# Patient Record
Sex: MALE | Race: WHITE
[De-identification: names, ages, dates, MRNs, and addresses within clinical notes are randomized per-mention and may not be internally consistent; named-entity substitution may affect disease eponyms.]

---

## 2017-10-26 ENCOUNTER — HOSPITAL ENCOUNTER (OUTPATIENT)
Dept: HOSPITAL 75 - RAD | Age: 77
End: 2017-10-26
Attending: INTERNAL MEDICINE
Payer: MEDICARE

## 2017-10-26 DIAGNOSIS — R05: ICD-10-CM

## 2017-10-26 DIAGNOSIS — J45.909: ICD-10-CM

## 2017-10-26 DIAGNOSIS — R06.00: Primary | ICD-10-CM

## 2017-10-26 LAB
ALBUMIN SERPL-MCNC: 4.2 GM/DL (ref 3.2–4.5)
ALT SERPL-CCNC: 29 U/L (ref 0–55)
ANION GAP SERPL CALC-SCNC: 8 MMOL/L (ref 5–14)
AST SERPL-CCNC: 24 U/L (ref 5–34)
BASOPHILS # BLD AUTO: 0 10^3/UL (ref 0–0.1)
BASOPHILS NFR BLD AUTO: 1 % (ref 0–10)
BILIRUB SERPL-MCNC: 0.6 MG/DL (ref 0.1–1)
BUN SERPL-MCNC: 11 MG/DL (ref 7–18)
BUN/CREAT SERPL: 11
CALCIUM SERPL-MCNC: 9.8 MG/DL (ref 8.5–10.1)
CHLORIDE SERPL-SCNC: 106 MMOL/L (ref 98–107)
CO2 SERPL-SCNC: 24 MMOL/L (ref 21–32)
CREAT SERPL-MCNC: 0.96 MG/DL (ref 0.6–1.3)
EOSINOPHIL # BLD AUTO: 0.1 10^3/UL (ref 0–0.3)
EOSINOPHIL NFR BLD AUTO: 1 % (ref 0–10)
ERYTHROCYTE [DISTWIDTH] IN BLOOD BY AUTOMATED COUNT: 13.7 % (ref 10–14.5)
GFR SERPLBLD BASED ON 1.73 SQ M-ARVRAT: > 60 ML/MIN
GLUCOSE SERPL-MCNC: 123 MG/DL (ref 70–105)
LYMPHOCYTES # BLD AUTO: 1.3 X 10^3 (ref 1–4)
LYMPHOCYTES NFR BLD AUTO: 25 % (ref 12–44)
MCH RBC QN AUTO: 34 PG (ref 25–34)
MCHC RBC AUTO-ENTMCNC: 34 G/DL (ref 32–36)
MCV RBC AUTO: 100 FL (ref 80–99)
MONOCYTES # BLD AUTO: 0.4 X 10^3 (ref 0–1)
MONOCYTES NFR BLD AUTO: 8 % (ref 0–12)
NEUTROPHILS # BLD AUTO: 3.6 X 10^3 (ref 1.8–7.8)
NEUTROPHILS NFR BLD AUTO: 66 % (ref 42–75)
PLATELET # BLD: 255 10^3/UL (ref 130–400)
PMV BLD AUTO: 8.8 FL (ref 7.4–10.4)
POTASSIUM SERPL-SCNC: 4.4 MMOL/L (ref 3.6–5)
PROT SERPL-MCNC: 7.6 GM/DL (ref 6.4–8.2)
RBC # BLD AUTO: 4.06 10^6/UL (ref 4.35–5.85)
SODIUM SERPL-SCNC: 138 MMOL/L (ref 135–145)
WBC # BLD AUTO: 5.5 10^3/UL (ref 4.3–11)

## 2017-10-26 PROCEDURE — 80053 COMPREHEN METABOLIC PANEL: CPT

## 2017-10-26 PROCEDURE — 85025 COMPLETE CBC W/AUTO DIFF WBC: CPT

## 2017-10-26 PROCEDURE — 83880 ASSAY OF NATRIURETIC PEPTIDE: CPT

## 2017-10-26 PROCEDURE — 36415 COLL VENOUS BLD VENIPUNCTURE: CPT

## 2017-10-26 PROCEDURE — 71020: CPT

## 2017-10-26 NOTE — DIAGNOSTIC IMAGING REPORT
PA and lateral views of the chest



Indication:  Shortness of breath



Findings: The lungs are clear. The heart size is normal. There is

no effusion or pneumothorax



The mediastinum and minh appear unremarkable.



Impression: Unremarkable study.



Dictated by: 



  Dictated on workstation # [id number]

## 2018-08-07 ENCOUNTER — HOSPITAL ENCOUNTER (OUTPATIENT)
Dept: HOSPITAL 75 - LAB | Age: 78
LOS: 24 days | Discharge: HOME | End: 2018-08-31
Attending: PHYSICIAN ASSISTANT
Payer: MEDICARE

## 2018-08-07 DIAGNOSIS — R19.7: Primary | ICD-10-CM

## 2018-08-07 PROCEDURE — 87329 GIARDIA AG IA: CPT

## 2018-08-07 PROCEDURE — 87449 NOS EACH ORGANISM AG IA: CPT

## 2018-08-07 PROCEDURE — 87046 STOOL CULTR AEROBIC BACT EA: CPT

## 2018-08-07 PROCEDURE — 87324 CLOSTRIDIUM AG IA: CPT

## 2018-08-07 PROCEDURE — 87328 CRYPTOSPORIDIUM AG IA: CPT

## 2018-08-07 PROCEDURE — 87045 FECES CULTURE AEROBIC BACT: CPT

## 2018-08-29 ENCOUNTER — APPOINTMENT (RX ONLY)
Dept: URBAN - METROPOLITAN AREA CLINIC 51 | Facility: CLINIC | Age: 78
Setting detail: DERMATOLOGY
End: 2018-08-29

## 2018-08-29 DIAGNOSIS — L57.0 ACTINIC KERATOSIS: ICD-10-CM

## 2018-08-29 PROBLEM — J45.909 UNSPECIFIED ASTHMA, UNCOMPLICATED: Status: ACTIVE | Noted: 2018-08-29

## 2018-08-29 PROBLEM — Z85.828 PERSONAL HISTORY OF OTHER MALIGNANT NEOPLASM OF SKIN: Status: ACTIVE | Noted: 2018-08-29

## 2018-08-29 PROBLEM — D48.5 NEOPLASM OF UNCERTAIN BEHAVIOR OF SKIN: Status: ACTIVE | Noted: 2018-08-29

## 2018-08-29 PROBLEM — M12.9 ARTHROPATHY, UNSPECIFIED: Status: ACTIVE | Noted: 2018-08-29

## 2018-08-29 PROBLEM — F41.9 ANXIETY DISORDER, UNSPECIFIED: Status: ACTIVE | Noted: 2018-08-29

## 2018-08-29 PROBLEM — J44.9 CHRONIC OBSTRUCTIVE PULMONARY DISEASE, UNSPECIFIED: Status: ACTIVE | Noted: 2018-08-29

## 2018-08-29 PROCEDURE — ? DEFER

## 2018-08-29 PROCEDURE — ? COUNSELING

## 2018-08-29 PROCEDURE — ? BIOPSY BY SHAVE METHOD

## 2018-08-29 PROCEDURE — 99202 OFFICE O/P NEW SF 15 MIN: CPT | Mod: 25

## 2018-08-29 PROCEDURE — 11100: CPT

## 2018-08-29 ASSESSMENT — LOCATION ZONE DERM
LOCATION ZONE: EAR
LOCATION ZONE: NECK

## 2018-08-29 ASSESSMENT — PAIN INTENSITY VAS: HOW INTENSE IS YOUR PAIN 0 BEING NO PAIN, 10 BEING THE MOST SEVERE PAIN POSSIBLE?: NO PAIN

## 2018-08-29 ASSESSMENT — LOCATION DETAILED DESCRIPTION DERM
LOCATION DETAILED: LEFT SUPERIOR LATERAL NECK
LOCATION DETAILED: LEFT SCAPHA

## 2018-08-29 ASSESSMENT — LOCATION SIMPLE DESCRIPTION DERM
LOCATION SIMPLE: LEFT EAR
LOCATION SIMPLE: NECK

## 2018-08-29 NOTE — HPI: SKIN LESION
Is This A New Presentation, Or A Follow-Up?: Skin Lesion
How Severe Is Your Skin Lesion?: moderate
Has Your Skin Lesion Been Treated?: not been treated
Additional History: Pt states that the larger lesion was drained and instructed that it was a cyst and let heal.\\nSmaller lesion was biopsied and was instructed to RTC to have lesion treated - biopsy proven to be BCC. Pt here today to have re-evaluated.

## 2018-08-29 NOTE — PROCEDURE: BIOPSY BY SHAVE METHOD
Hemostasis: Electrocautery
Was A Bandage Applied: Yes
Lab Facility: [id number]
Lab: [id number]
Bill [id number] For Specimen Handling/Conveyance To Laboratory?: no
Billing Type: Third-Party Bill
Notification Instructions: Patient will be notified of biopsy results. However, patient instructed to call the office if not contacted within 2 weeks.
Detail Level: Detailed
Wound Care: Aquaphor
Path Notes (To The Dermatopathologist): 2.0 cm
Depth Of Biopsy: dermis
Consent: Verbal consent was obtained and risks were reviewed including but not limited to scarring, infection, bleeding, scabbing, incomplete removal, nerve damage and allergy to anesthesia.
Anesthesia Volume In Cc (Will Not Render If 0): 1
Size Of Lesion In Cm: 2
Anesthesia Type: 1% lidocaine with epinephrine
Biopsy Method: Terrence lewis
Post-Care Instructions: I reviewed with the patient in detail post-care instructions. Patient is to keep the biopsy site dry overnight, and then apply vaseline twice daily until healed. Patient may apply hydrogen peroxide soaks to remove any crusting.
X Size Of Lesion In Cm: 0
Dressing: Band-Aid
Anticipated Plan (Based On Presumed Biopsy Results): If proven to be SCC, will consider MOHS or ENT in [facility] (Dr. Kapoor)
Type Of Destruction Used: Electrodesiccation
Biopsy Type: H and E

## 2018-10-31 ENCOUNTER — HOSPITAL ENCOUNTER (OUTPATIENT)
Dept: HOSPITAL 75 - PREOP | Age: 78
Discharge: HOME | End: 2018-10-31
Attending: OTOLARYNGOLOGY
Payer: MEDICARE

## 2018-10-31 VITALS — SYSTOLIC BLOOD PRESSURE: 153 MMHG | DIASTOLIC BLOOD PRESSURE: 84 MMHG

## 2018-10-31 VITALS — HEIGHT: 78 IN | BODY MASS INDEX: 27.77 KG/M2 | WEIGHT: 240 LBS

## 2018-10-31 DIAGNOSIS — Z11.2: ICD-10-CM

## 2018-10-31 DIAGNOSIS — Z01.811: ICD-10-CM

## 2018-10-31 DIAGNOSIS — Z01.812: Primary | ICD-10-CM

## 2018-10-31 DIAGNOSIS — Z01.810: ICD-10-CM

## 2018-10-31 LAB
BASOPHILS # BLD AUTO: 0 10^3/UL (ref 0–0.1)
BASOPHILS NFR BLD AUTO: 0 % (ref 0–10)
BUN/CREAT SERPL: 13
CALCIUM SERPL-MCNC: 9.9 MG/DL (ref 8.5–10.1)
CHLORIDE SERPL-SCNC: 106 MMOL/L (ref 98–107)
CO2 SERPL-SCNC: 24 MMOL/L (ref 21–32)
CREAT SERPL-MCNC: 0.99 MG/DL (ref 0.6–1.3)
EOSINOPHIL # BLD AUTO: 0.1 10^3/UL (ref 0–0.3)
EOSINOPHIL NFR BLD AUTO: 1 % (ref 0–10)
ERYTHROCYTE [DISTWIDTH] IN BLOOD BY AUTOMATED COUNT: 13.8 % (ref 10–14.5)
GFR SERPLBLD BASED ON 1.73 SQ M-ARVRAT: > 60 ML/MIN
GLUCOSE SERPL-MCNC: 136 MG/DL (ref 70–105)
HCT VFR BLD CALC: 42 % (ref 40–54)
HGB BLD-MCNC: 13.8 G/DL (ref 13.3–17.7)
LYMPHOCYTES # BLD AUTO: 1.4 X 10^3 (ref 1–4)
LYMPHOCYTES NFR BLD AUTO: 22 % (ref 12–44)
MANUAL DIFFERENTIAL PERFORMED BLD QL: NO
MCH RBC QN AUTO: 33 PG (ref 25–34)
MCHC RBC AUTO-ENTMCNC: 33 G/DL (ref 32–36)
MCV RBC AUTO: 101 FL (ref 80–99)
MONOCYTES # BLD AUTO: 0.6 X 10^3 (ref 0–1)
MONOCYTES NFR BLD AUTO: 9 % (ref 0–12)
NEUTROPHILS # BLD AUTO: 4.3 X 10^3 (ref 1.8–7.8)
NEUTROPHILS NFR BLD AUTO: 68 % (ref 42–75)
PLATELET # BLD: 227 10^3/UL (ref 130–400)
PMV BLD AUTO: 9.5 FL (ref 7.4–10.4)
POTASSIUM SERPL-SCNC: 4.3 MMOL/L (ref 3.6–5)
RBC # BLD AUTO: 4.13 10^6/UL (ref 4.35–5.85)
SODIUM SERPL-SCNC: 140 MMOL/L (ref 135–145)
WBC # BLD AUTO: 6.3 10^3/UL (ref 4.3–11)

## 2018-10-31 PROCEDURE — 71046 X-RAY EXAM CHEST 2 VIEWS: CPT

## 2018-10-31 PROCEDURE — 80048 BASIC METABOLIC PNL TOTAL CA: CPT

## 2018-10-31 PROCEDURE — 85025 COMPLETE CBC W/AUTO DIFF WBC: CPT

## 2018-10-31 PROCEDURE — 87081 CULTURE SCREEN ONLY: CPT

## 2018-10-31 PROCEDURE — 93005 ELECTROCARDIOGRAM TRACING: CPT

## 2018-10-31 PROCEDURE — 36415 COLL VENOUS BLD VENIPUNCTURE: CPT

## 2018-10-31 NOTE — DIAGNOSTIC IMAGING REPORT
INDICATION: Preoperative evaluation prior to left face surgery.



PA and lateral views of the chest are obtained with comparison

made to study of 10/26/2017.



FINDINGS: Heart size and pulmonary vascularity are within normal

limits, and the lungs are clear, bilaterally.  



IMPRESSION: Unremarkable chest. 



Dictated by: 



  Dictated on workstation # [id number]

## 2018-11-08 ENCOUNTER — HOSPITAL ENCOUNTER (OUTPATIENT)
Dept: HOSPITAL 75 - SDC | Age: 78
Discharge: HOME | End: 2018-11-08
Attending: OTOLARYNGOLOGY
Payer: MEDICARE

## 2018-11-08 VITALS — SYSTOLIC BLOOD PRESSURE: 125 MMHG | DIASTOLIC BLOOD PRESSURE: 75 MMHG

## 2018-11-08 VITALS — SYSTOLIC BLOOD PRESSURE: 157 MMHG | DIASTOLIC BLOOD PRESSURE: 91 MMHG

## 2018-11-08 VITALS — WEIGHT: 240 LBS | HEIGHT: 78 IN | BODY MASS INDEX: 27.77 KG/M2

## 2018-11-08 VITALS — DIASTOLIC BLOOD PRESSURE: 91 MMHG | SYSTOLIC BLOOD PRESSURE: 157 MMHG

## 2018-11-08 DIAGNOSIS — Z79.899: ICD-10-CM

## 2018-11-08 DIAGNOSIS — J45.909: ICD-10-CM

## 2018-11-08 DIAGNOSIS — J44.9: ICD-10-CM

## 2018-11-08 DIAGNOSIS — K21.9: ICD-10-CM

## 2018-11-08 DIAGNOSIS — C44.320: Primary | ICD-10-CM

## 2018-11-08 PROCEDURE — 88331 PATH CONSLTJ SURG 1 BLK 1SPC: CPT

## 2018-11-08 PROCEDURE — 87081 CULTURE SCREEN ONLY: CPT

## 2018-11-08 PROCEDURE — 88305 TISSUE EXAM BY PATHOLOGIST: CPT

## 2018-11-08 PROCEDURE — 88332 PATH CONSLTJ SURG EA ADD BLK: CPT

## 2018-11-08 NOTE — PROGRESS NOTE-POST OPERATIVE
Post-Operative Progess Note


Surgeon (s)/Assistant (s)


Surgeon


SHILOH DSOUZA MD


Assistant


n/a





Pre-Operative Diagnosis


Large Left Cheeck Lesions-squaous cell carcinoma





Post-Operative Diagnosis


same





Post-Op Procedure Note


Date of Procedure:  Nov 8, 2018


Name of Procedure Performed:  


Excision of Left Cheek Lesion with Local ADvancement Flap REconstruction


Description & Findings


Description and Findings:





n/a


Anesthesia Type


get


Estimated Blood Loss


minimal


Packing


none.


Specimen(s) collected/removed


left checck lesion for margin control











SHILOH DSOUZA MD Nov 8, 2018 9:36 am

## 2018-11-08 NOTE — ANESTHESIA-GENERAL POST-OP
General


Patient Condition


Mental Status/LOC:  Same as Preop


Cardiovascular:  Satisfactory


Nausea/Vomiting:  Absent


Respiratory:  Satisfactory


Pain:  Controlled


Complications:  Absent





Post Op Complications


Complications


None





Follow Up Care/Instructions


Patient Instructions


None needed.





Anesthesia/Patient Condition


Patient Condition


Patient is doing well, no complaints, stable vital signs, no apparent adverse 

anesthesia problems.   


No complications reported per nursing.











ASHLEY YUN CRNA Nov 8, 2018 11:57

## 2018-11-08 NOTE — PROGRESS NOTE-PRE OPERATIVE
Pre-Operative Progress Note


H&P Reviewed


The H&P was reviewed, patient examined and no changes noted.


Date Seen by Provider:  Nov 8, 2018


Time Seen by Provider:  08:40


Date H&P Reviewed:  Nov 8, 2018


Time H&P Reviewed:  08:20


Pre-Operative Diagnosis:  Large Left Cheeck Lesions-squaous cell carcinoma











SHILOH DSOUZA MD Nov 8, 2018 8:22 am

## 2018-11-29 ENCOUNTER — HOSPITAL ENCOUNTER (OUTPATIENT)
Dept: HOSPITAL 75 - RAD | Age: 78
End: 2018-11-29
Attending: NURSE PRACTITIONER
Payer: MEDICARE

## 2018-11-29 DIAGNOSIS — S76.111A: Primary | ICD-10-CM

## 2018-11-29 DIAGNOSIS — S83.241A: ICD-10-CM

## 2018-11-29 DIAGNOSIS — S83.271A: ICD-10-CM
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
## 2018-11-29 DIAGNOSIS — M94.8X8: ICD-10-CM

## 2018-11-29 DIAGNOSIS — M17.11: ICD-10-CM

## 2018-11-29 PROCEDURE — 73721 MRI JNT OF LWR EXTRE W/O DYE: CPT

## 2018-11-29 NOTE — DIAGNOSTIC IMAGING REPORT
PROCEDURE: MRI right joint lower extremity without contrast.



TECHNIQUE: Multiplanar, multisequence non contrast-enhanced MRI

of the right lower extremity was accomplished.



INDICATION: Multiple falls recently with right knee pain. Injury

of right quadriceps muscle and tendon.



COMPARISON: None



FINDINGS:



No acute fractures are seen in the right knee. Alignment appears

normal. There is a large right knee joint effusion.



There is thinning, surface irregularity and heterogeneity of the

articular cartilage in the patellofemoral compartment with a

small full-thickness defect of the median ridge. The articular

cartilage in the medial compartment demonstrates mild

heterogeneity and surface irregularity. The articular cartilage

in the lateral compartment demonstrates mild thinning and surface

irregularity with a small full-thickness defect at the lateral

tibial plateau.



There is complex, predominantly horizontal tearing of the

posterior horn of the medial meniscus. There is also complex

tearing involving the entirety of the lateral meniscus, with

maceration and extrusion. A meniscal fragment is flipped

inferiorly into the meniscotibial recess.



The anterior and posterior cruciate ligaments are intact. The

medial collateral ligament appears intact. The lateral collateral

ligamentous complex is intact.



There is a complete tear through the distal quadriceps tendon.

There is at least 2.5 cm of retraction, although the distal stump

is incompletely included. There is joint fluid extending into the

prepatellar bursa. There is mild soft tissue edema anteriorly.



IMPRESSION:



1. Complete tear through the distal right quadriceps tendon, with

2.5 cm of retraction. The tendon from the vastus medialis appears

to extend somewhat further distally, but also appears completely

torn.



2. Large right knee joint effusion, which extends into the

prepatellar bursa.



3. Tearing of the medial and lateral menisci, likely

degenerative.



4. Mild tricompartmental cartilage loss.



Dictated by: 



  Dictated on workstation # [id number]

## 2018-12-10 NOTE — HISTORY AND PHYSICAL
DATE OF SERVICE:  



ADMISSION HISTORY AND PHYSICAL



DATE OF ADMISSION:

12/19/2018 for right knee quadriceps tendon repair as well as right knee

arthroscopy.



HISTORY OF PRESENT ILLNESS:

The patient is a 78-year-old gentleman with complaints of right knee pain,

catching, locking and swelling.  He had several falls in late September or early

October.  He had difficulty with ambulation following a fall at the beginning of

October.  He reports he has been unable to actively extend the right knee since

that time and has noted a defect.  He also reports some mild anterior knee pain

prior to this.  An MRI was obtained, which revealed medial and lateral meniscal

tears as well as a defect of his distal quadriceps tendon approximately 2.5 cm

in size.  Due to functional impairment and disruption of quadriceps tendon, the

patient has elected to proceed with surgical intervention.  He understands that

he may never regain full function due to the chronic nature of this.



REVIEW OF SYSTEMS:

No chest pain, no shortness of breath.  No dysuria.



PAST MEDICAL HISTORY:

COPD, asthma and dementia.



PAST SURGICAL HISTORY:

Skin cancer excision, cholecystectomy and appendectomy.



FAMILY HISTORY:

Significant for cancer and cardiovascular disease.



PRIMARY CARE PROVIDER:

Dr. Mccray.



MEDICATIONS:

Breo-Ellipta, vilanterol, albuterol and ibuprofen.



ALLERGIES:

PENICILLIN and ___.



SOCIAL HISTORY:

The patient denies alcohol and tobacco use.



PHYSICAL EXAMINATION:

GENERAL:  The patient is a well-developed and well-nourished, in no acute

distress.

HEENT:  Normocephalic and atraumatic.  Pupils are equal, round and reactive to

light.  Oropharynx is clear.

NECK:  Supple and no lymphadenopathy.

LUNGS:  Clear to auscultation bilaterally.

HEART:  Regular rate and rhythm.

ABDOMEN:  Soft, nontender and nondistended.

EXTREMITIES:  The right knee demonstrates a defect just superior to his patella,

which is tender to palpation.  He has weakness with extension and cannot obtain

full extension or maintain full extension.



IMPRESSION:

Right quadriceps tendon disruption with medial and lateral meniscus tears.



PLAN:

Right knee quadriceps tendon repair with possible arthroscopy and partial

meniscectomies.  Risks, benefits, options, ramifications and recovery were

discussed at length with the patient.  He understands and wishes to proceed.





Job ID: [id number]

DocumentID: [id number]

Dictated Date:  12/10/2018 11:08:02

Transcription Date: 12/10/2018 11:46:43

Dictated By: SHILOH LOBO MD

## 2018-12-17 ENCOUNTER — HOSPITAL ENCOUNTER (OUTPATIENT)
Dept: HOSPITAL 75 - PREOP | Age: 78
Discharge: HOME | End: 2018-12-17
Attending: ORTHOPAEDIC SURGERY
Payer: MEDICARE

## 2018-12-17 VITALS — HEIGHT: 78 IN | BODY MASS INDEX: 27.77 KG/M2 | WEIGHT: 240 LBS

## 2018-12-17 DIAGNOSIS — Z01.818: Primary | ICD-10-CM

## 2018-12-19 ENCOUNTER — HOSPITAL ENCOUNTER (OUTPATIENT)
Dept: HOSPITAL 75 - 4TH | Age: 78
LOS: 2 days | Discharge: NURSING, REHAB OR LONG-TERM CARE | End: 2018-12-21
Attending: ORTHOPAEDIC SURGERY
Payer: MEDICARE

## 2018-12-19 VITALS — SYSTOLIC BLOOD PRESSURE: 125 MMHG | DIASTOLIC BLOOD PRESSURE: 72 MMHG

## 2018-12-19 VITALS — SYSTOLIC BLOOD PRESSURE: 132 MMHG | DIASTOLIC BLOOD PRESSURE: 63 MMHG

## 2018-12-19 VITALS — DIASTOLIC BLOOD PRESSURE: 85 MMHG | SYSTOLIC BLOOD PRESSURE: 143 MMHG

## 2018-12-19 VITALS — SYSTOLIC BLOOD PRESSURE: 178 MMHG | DIASTOLIC BLOOD PRESSURE: 87 MMHG

## 2018-12-19 VITALS — DIASTOLIC BLOOD PRESSURE: 68 MMHG | SYSTOLIC BLOOD PRESSURE: 125 MMHG

## 2018-12-19 VITALS — WEIGHT: 232.13 LBS | BODY MASS INDEX: 26.86 KG/M2 | HEIGHT: 78 IN

## 2018-12-19 VITALS — SYSTOLIC BLOOD PRESSURE: 144 MMHG | DIASTOLIC BLOOD PRESSURE: 81 MMHG

## 2018-12-19 VITALS — SYSTOLIC BLOOD PRESSURE: 123 MMHG | DIASTOLIC BLOOD PRESSURE: 70 MMHG

## 2018-12-19 DIAGNOSIS — Z79.899: ICD-10-CM

## 2018-12-19 DIAGNOSIS — F03.90: ICD-10-CM

## 2018-12-19 DIAGNOSIS — R53.81: ICD-10-CM

## 2018-12-19 DIAGNOSIS — J45.909: ICD-10-CM

## 2018-12-19 DIAGNOSIS — J44.9: ICD-10-CM
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
## 2018-12-19 DIAGNOSIS — K21.9: ICD-10-CM

## 2018-12-19 DIAGNOSIS — S76.111A: Primary | ICD-10-CM

## 2018-12-19 PROCEDURE — 87081 CULTURE SCREEN ONLY: CPT

## 2018-12-19 RX ADMIN — OXYCODONE HYDROCHLORIDE AND ACETAMINOPHEN PRN TAB: 5; 325 TABLET ORAL at 13:35

## 2018-12-19 RX ADMIN — SODIUM CHLORIDE, SODIUM LACTATE, POTASSIUM CHLORIDE, AND CALCIUM CHLORIDE PRN MLS/HR: 600; 310; 30; 20 INJECTION, SOLUTION INTRAVENOUS at 08:50

## 2018-12-19 RX ADMIN — SODIUM CHLORIDE, SODIUM LACTATE, POTASSIUM CHLORIDE, AND CALCIUM CHLORIDE PRN MLS/HR: 600; 310; 30; 20 INJECTION, SOLUTION INTRAVENOUS at 10:40

## 2018-12-19 RX ADMIN — OXYCODONE HYDROCHLORIDE AND ACETAMINOPHEN PRN TAB: 5; 325 TABLET ORAL at 15:58

## 2018-12-19 NOTE — PROGRESS NOTE-PRE OPERATIVE
Pre-Operative Progress Note


H&P Reviewed


The H&P was reviewed, patient examined and no changes noted.


Date Seen by Provider:  Dec 19, 2018


Time Seen by Provider:  08:37


Date H&P Reviewed:  Dec 19, 2018


Time H&P Reviewed:  08:37


Pre-Operative Diagnosis:  right quadriceps tendon tear











SHILOH LOBO MD Dec 19, 2018 08:38

## 2018-12-19 NOTE — ANESTHESIA-GENERAL POST-OP
General


Patient Condition


Mental Status/LOC:  Same as Preop


Cardiovascular:  Satisfactory


Nausea/Vomiting:  Absent


Respiratory:  Satisfactory


Pain:  Controlled


Complications:  Absent





Post Op Complications


Complications


None





Follow Up Care/Instructions


Patient Instructions


None needed.





Anesthesia/Patient Condition


Patient Condition


Patient is doing well, no complaints, stable vital signs, no apparent adverse 

anesthesia problems.   


No complications reported per nursing.











HEATHER ECHEVERRIA CRNA Dec 19, 2018 13:57

## 2018-12-19 NOTE — OPERATIVE REPORT
DATE OF SERVICE:  12/19/2018



PREOPERATIVE DIAGNOSIS:

Right quadriceps tendon tear.



POSTOPERATIVE DIAGNOSIS:

Right quadriceps tendon tear.



PROCEDURE PERFORMED:

Right quadriceps tendon repair.



SURGEON:

Herb Lobo MD.



ASSISTANT:

ARI De La Rosa, who assisted throughout the procedure and closed the incision.



ANESTHESIA:

General endotracheal by Ha Ross CRNA.



TOURNIQUET TIME:

41 minutes at 300 mmHg.



ESTIMATED BLOOD LOSS:

Minimal.



DRAINS:

None.



COMPLICATIONS:

None.



POSTOPERATIVE PLAN:

0 to 30 degrees range of motion for the first 2 weeks, advancing to 0 to 90 over

the next six weeks total with toe touch weightbearing for the first 2 weeks on

crutches.  The patient was transferred to the recovery room in awake and stable

condition.



STATEMENT OF MEDICAL NECESSITY:

The patient is a 78-year-old gentleman who over 2 months ago fell on his right

knee and since then he has had right knee pain.  An MRI revealed disruption of

his quadriceps tendon.  He did have the ability to actively extend his knee, but

had an extensor lag with weakness.  He was tender over his defect.  The patient

had ambulated with a crutch.  Due to functional impairment, the patient elected

to proceed with surgical intervention.



DESCRIPTION OF PROCEDURE:

After risks and benefits of procedure were discussed and questions were

answered, an informed consent was signed and placed on chart.  The operative

site was confirmed in the preoperative holding area initialed by the surgeon. 

The patient was transported to the operating room and after adequate level of

general endotracheal anesthetic was obtained, a timeout was called confirming

the operative site.  The right lower extremity was then prepped and draped in

the usual sterile fashion with the leg elevated and the knee flexed, tourniquet

was inflated to 300 mmHg.  A standard anterior approach was utilized. 

Hemostasis was obtained with cautery.  There was marked scar formation distally,

but the quadriceps was completely disrupted from the patella.  The scar tissue

was removed on the distal aspect of the quadriceps tendon back to normal tendon.

 The tendon was then whipstitched with four separate sutures leaving four arms

distally using #5 Tevdek.  The tendon was then grasped and pulled distally and

was able to be approximated to its native attachment site on the patella.  This

area was then debrided with a rongeur to a bleeding bed and three vertical drill

holes were placed through the patella being careful to stay anterior to the

articular surface.  The four suture arms were then passed through the tunnels

and tied distally with the knee held in full extension.  The tendon was

approximated very well.  This was then reinforced with a #1 Vicryl into the

periosteum in figure-of-eight interrupted fashion.  The capsule was closed with

Tevdek in figure-of-eight interrupted fashion as well.  The knee could then be

flexed to 90 degrees with no undue tension at the repair site and remained well

approximated beyond 90 degrees.  The tourniquet was deflated for a total

tourniquet time of 41 minutes.  The wound was copiously irrigated.  A 2-0 Vicryl

was used to reapproximate subcutaneous tissue and the skin was closed with

staples.  A soft dressing was applied followed by a brace, which was locked from

0 to 30 degrees range of motion and the patient was transported to the recovery

room awaken and in stable condition.





Job ID: [id number]

DocumentID: [id number]

Dictated Date:  12/19/2018 11:03:17

Transcription Date: 12/19/2018 19:41:15

Dictated By: HERB LOBO MD

## 2018-12-19 NOTE — PROGRESS NOTE-POST OPERATIVE
Post-Operative Progess Note


Surgeon (s)/Assistant (s)


Surgeon


SHILOH LOBO MD


Assistant:  Ha Alexis





Pre-Operative Diagnosis


right quadriceps tendon tear





Post-Operative Diagnosis





right quadriceps tendon tear





Procedure & Operative Findings


Date of Procedure


12/19/18


Procedure Performed/Findings


right quadriceps tendon repair


Anesthesia Type


GETA





Estimated Blood Loss


Estimated blood loss (mL):  minimal





Specimens/Packing


Specimens Removed


none


Packing:  


none











SHILOH LOBO MD Dec 19, 2018 08:39

## 2018-12-20 VITALS — DIASTOLIC BLOOD PRESSURE: 57 MMHG | SYSTOLIC BLOOD PRESSURE: 128 MMHG

## 2018-12-20 VITALS — SYSTOLIC BLOOD PRESSURE: 132 MMHG | DIASTOLIC BLOOD PRESSURE: 65 MMHG

## 2018-12-20 VITALS — SYSTOLIC BLOOD PRESSURE: 161 MMHG | DIASTOLIC BLOOD PRESSURE: 72 MMHG

## 2018-12-20 VITALS — DIASTOLIC BLOOD PRESSURE: 70 MMHG | SYSTOLIC BLOOD PRESSURE: 147 MMHG

## 2018-12-20 VITALS — DIASTOLIC BLOOD PRESSURE: 68 MMHG | SYSTOLIC BLOOD PRESSURE: 147 MMHG

## 2018-12-20 VITALS — DIASTOLIC BLOOD PRESSURE: 59 MMHG | SYSTOLIC BLOOD PRESSURE: 121 MMHG

## 2018-12-20 RX ADMIN — OXYCODONE HYDROCHLORIDE AND ACETAMINOPHEN PRN TAB: 5; 325 TABLET ORAL at 14:28

## 2018-12-20 RX ADMIN — OXYCODONE HYDROCHLORIDE AND ACETAMINOPHEN PRN TAB: 5; 325 TABLET ORAL at 10:15

## 2018-12-20 RX ADMIN — OXYCODONE HYDROCHLORIDE AND ACETAMINOPHEN PRN TAB: 5; 325 TABLET ORAL at 19:49

## 2018-12-20 RX ADMIN — OXYCODONE HYDROCHLORIDE AND ACETAMINOPHEN PRN TAB: 5; 325 TABLET ORAL at 00:33

## 2018-12-20 RX ADMIN — OXYCODONE HYDROCHLORIDE AND ACETAMINOPHEN PRN TAB: 5; 325 TABLET ORAL at 05:27

## 2018-12-20 NOTE — PROGRESS NOTE-STANDARD
Standard Progress Note


Progress Notes/Assess & Plan


Date Seen by a Provider:  Dec 20, 2018


Time Seen by a Provider:  08:01


Progress/Assessment & Plan


patient having difficulty with ambulation and not willing to give much effort





Vital Signs








  Date Time  Temp Pulse Resp B/P (MAP) Pulse Ox O2 Delivery O2 Flow Rate FiO2


 


12/20/18 04:00 98.9 90 18 146/98 (114) 98 Room Air  


 


12/20/18 00:05 99.6 100 20 147/70 (95) 97 Room Air  


 


12/19/18 21:00      Room Air  


 


12/19/18 19:30 100.8 101 18 132/63 (86) 92 Room Air  


 


12/19/18 17:10 100.2 90 20 178/87 (117) 96 Room Air  


 


12/19/18 17:00 99.3 80 18 144/81 (102) 96 Room Air  


 


12/19/18 13:45 96.9 65 16 125/68 97 Room Air  


 


12/19/18 13:15 97.2 71 16 123/70 100 Room Air  


 


12/19/18 12:45 96.8 71 18 125/72 100 Room Air  


 


12/19/18 09:00 97.7 77 18 143/85 (104) 97 Room Air  














I & O 


 


 12/20/18





 07:00


 


Intake Total 2245 ml


 


Output Total 605 ml


 


Balance 1640 ml





RLE--dressing intact. Intact DF and PF of toes and ankle. sensation intact 

throughout


s/p r quad repair


ok to wbat with walker


will need placement--consult SHILOH RAMOS MD Dec 20, 2018 08:03

## 2018-12-20 NOTE — PHYSICAL THERAPY EVALUATION
PT Evaluation-General


Medical Diagnosis


Admission Date


Dec 19, 2018 at 16:31


Medical Diagnosis:  post patellar tendon repair


Onset Date:  Dec 19, 2018





Therapy Diagnosis


Therapy Diagnosis:  weakness; abn gait





Height/Weight


Height (Feet):  6


Height (Inches):  6.00


Weight (Pounds):  232


Weight (Ounces):  2.0





Precautions


Precautions/Isolations:  Fall Prevention, Standard Precautions





Weight Bear Status


Right Lower Extremity:  Right


Weight Bearing/Tolerated


Left Lower Extremity:  Left


Full Weight Bearing





Referral


Physician:  Ronit


Reason for Referral:  Evaluation/Treatment





Medical History


Reviewed History:  Yes





Social History


Home:  Single Level


Current Living Status:  Spouse


Entry Into Home:  Stairs Without Railing (uses a pole to pull up on)





Prior/Core FIM


Prior Level of Function


Therapy Code Descriptions/Definitions 





Functional Banks Measure:


0=Not Assessed/NA        4=Minimal Assistance


1=Total Assistance        5=Supervision or Setup


2=Maximal Assistance  6=Modified Banks


3=Moderate Assistance 7=Complete Banks








Therapy Quality Codes:


6    Independent with activity with or without an assistive device


5    Patient requires set up or clean up by helper.  Patient completes activity

  by  themselves


4    Supervision or touching assist (CGA). [place] provide cues , steadying 

assist


3    The helper provides less than half the effort to complete the activity


2    The helper provides more than half the effort to complete the activity


1    Dependent.  The helper does all the effort to complete an activity 


7    Patient refused to complete or attempt activity


9    The patient did not perform the activity before the current illness or 

injury


88  Not attempted due to Medical conditions or safety concerns





Functional Abilities and Goals:


Independent: Patient completed the activities by him/herself, with or without 

an assistive device, with no assistance from a helper.


Needed Some Help: Patient needed partial assistance from another person to 

complete activities.


Dependent: A helper completed the activities for the patient. 


Unknown:


Not Applicable:


Bed Mobility:  7


Transfers (B,C,W/C) (FIM):  7


Gait:  7


has a left recliner at home.  Indep mobility and self care. drives





PT Evaluation-Current


Subjective


Agrees to PT.  Reports he does not feel he could manage at home yet.





Pain





   Numeric Pain Scale:  6


   Location:  Right


   Location Body Site:  Thigh (quad/knee area)


   Pain Description:  Ache, Pressure





Pt/Family Goals


Home at a mod indep level





Objective


Patient Orientation:  Person, Place, Time, Situation


Problem Solving:  Fair


Attachments:  Knee Immobilizer





ROM/Strength


ROM Lower Extremities


WFL; knee immobilizer right


Strength Lower Extremities


grossly 3/5 right; left 4/5





Integumentary/Posture


Integumentary


refer to nursing notes.


Bowel Incontinence:  No


Bladder Incontinence:  No


Posture


symmetrical





Neuromuscular


(Tone, Coordination, Reflexes)


intact and functional





Sensory


Vision:  Functional


Hearing:  Functional


Hand Dominance:  Right


Sensation Right Lower Extremit:  Intact


Sensation Left Lower Extremity:  Intact





Transfers


Therapy Code Descriptions/Definitions 





Functional Banks Measure:


0=Not Assessed/NA        4=Minimal Assistance


1=Total Assistance        5=Supervision or Setup


2=Maximal Assistance  6=Modified Banks


3=Moderate Assistance 7=Complete Banks


Transfers (B, C, W/C) (FIM):  3


Supine to/from Sit:  4 (assist with right LE)


Sit to/from Stand:  3 (Mod assist to come to a stand from a standard height 

chair. )


EXtra time and exertion with min assist to come to a stand from a elevated 

surface.





Gait


Mode of Locomotion:  Walk


Anticipated Mode of Locomotion:  Walk


Gait (FIM):  2


Distance (FIM):  1=up to 49 ft


Distance:  40 ft


Gait Level of Assist:  4


Gait Assistive Device:  FWW


Comments/Gait Description


slow gait with poor foot clearance B and decreased step length.





Stairs


Stairs (FIM):  1


#of Steps:  1


Assistive Device:  Walker


1 step with FWW with min assist and extra time to complete with cues to 

sequenc.e





Balance


Sitting Static:  Good


Sitting Dynamic:  Good


Standing Static:  Fair


 Standing Dynamic:  Fair





Treatment


Gait training and progression.





Assessment/Needs


Post patellar tendon repair.  Confirmed with Ha Alexis that he is WBAT as per 

order.  Pt requires extra time to complete all mobiltiy and needs assist with 

transfers and gait sequencing technique.  He has much difficulty with a curb 

step as well.  Pt and spouse feel managment of mobility at home would be 

difficult and I do agree.  Pt will beneift from skilled PT to work on gait and 

transfers to allow him to return home at a mod indep level.


Rehab Potential:  Good





PT Long Term Goals


Long Term Goals


PT Long Term Goals Time Frame:  Dec 27, 2018


Transfers (B,C,W/C) (FIM):  6


Gait (FIM):  6





PT Plan


Problem List


Problem List:  Activity Tolerance, Functional Strength, Safety, Balance, Gait, 

Transfer, Bed Mobility





Treatment/Plan


Treatment Plan:  Continue Plan of Care


Treatment Plan:  Bed Mobility, Education, Functional Activity Deepti, Functional 

Strength, Gait, Safety, Therapeutic Exercise


Treatment Duration:  Dec 27, 2018


Frequency:  6 times per week


Estimated Hrs Per Day:  1.5 hours per day


Patient and/or Family Agrees t:  Yes





Safety Risks/Education


Patient Education:  Gait Training, Transfer Techniques


Teaching Recipient:  Patient


Teaching Methods:  Demonstration, Discussion


Response to Teaching:  Reinforcement Needed





Time/GCodes


Time In:  1000


Time Out:  1040


Total Billed Treatment Time:  40


Total Billed Treatment


visit


EVM 15


GT 25


G Codes Necessary:  Yes





PT/OT Therapy GCodes


Therapy


Functional Limitation:  Physical Therapy


Test(s)/Tool used to determine:  Level of Assistance Scale





Functional Limitation-Current


Charge Code:  MOBCUR


Modifier:  CL





Functional Limitation-Goal


Charge Code:  MOBGOAL


Modifier:  CI





Functional Limitation-D/C


Charge Codes:  MOBDC


Modifier:  CL











TAPAN GA PT Dec 20, 2018 11:12

## 2018-12-21 ENCOUNTER — HOSPITAL ENCOUNTER (INPATIENT)
Dept: HOSPITAL 75 - IRF | Age: 78
LOS: 13 days | Discharge: HOME | DRG: 949 | End: 2019-01-03
Attending: PHYSICAL MEDICINE & REHABILITATION | Admitting: FAMILY MEDICINE
Payer: MEDICARE

## 2018-12-21 VITALS — SYSTOLIC BLOOD PRESSURE: 133 MMHG | DIASTOLIC BLOOD PRESSURE: 65 MMHG

## 2018-12-21 VITALS — SYSTOLIC BLOOD PRESSURE: 127 MMHG | DIASTOLIC BLOOD PRESSURE: 66 MMHG

## 2018-12-21 VITALS — DIASTOLIC BLOOD PRESSURE: 74 MMHG | SYSTOLIC BLOOD PRESSURE: 131 MMHG

## 2018-12-21 VITALS — BODY MASS INDEX: 26.86 KG/M2 | WEIGHT: 232.13 LBS | HEIGHT: 78 IN

## 2018-12-21 VITALS — DIASTOLIC BLOOD PRESSURE: 78 MMHG | SYSTOLIC BLOOD PRESSURE: 149 MMHG

## 2018-12-21 DIAGNOSIS — S83.241D: ICD-10-CM

## 2018-12-21 DIAGNOSIS — R45.851: ICD-10-CM

## 2018-12-21 DIAGNOSIS — R41.89: ICD-10-CM

## 2018-12-21 DIAGNOSIS — K21.9: ICD-10-CM

## 2018-12-21 DIAGNOSIS — B96.20: ICD-10-CM

## 2018-12-21 DIAGNOSIS — S83.281D: ICD-10-CM

## 2018-12-21 DIAGNOSIS — S76.111D: Primary | ICD-10-CM

## 2018-12-21 DIAGNOSIS — F32.9: ICD-10-CM

## 2018-12-21 DIAGNOSIS — N30.00: ICD-10-CM

## 2018-12-21 DIAGNOSIS — J44.9: ICD-10-CM

## 2018-12-21 DIAGNOSIS — R01.1: ICD-10-CM

## 2018-12-21 PROCEDURE — 87088 URINE BACTERIA CULTURE: CPT

## 2018-12-21 PROCEDURE — 80053 COMPREHEN METABOLIC PANEL: CPT

## 2018-12-21 PROCEDURE — 87077 CULTURE AEROBIC IDENTIFY: CPT

## 2018-12-21 PROCEDURE — 85025 COMPLETE CBC W/AUTO DIFF WBC: CPT

## 2018-12-21 PROCEDURE — 94760 N-INVAS EAR/PLS OXIMETRY 1: CPT

## 2018-12-21 PROCEDURE — 36415 COLL VENOUS BLD VENIPUNCTURE: CPT

## 2018-12-21 PROCEDURE — 94640 AIRWAY INHALATION TREATMENT: CPT

## 2018-12-21 PROCEDURE — 87186 SC STD MICRODIL/AGAR DIL: CPT

## 2018-12-21 PROCEDURE — 81000 URINALYSIS NONAUTO W/SCOPE: CPT

## 2018-12-21 RX ADMIN — OXYCODONE HYDROCHLORIDE AND ACETAMINOPHEN PRN TAB: 5; 325 TABLET ORAL at 00:09

## 2018-12-21 RX ADMIN — OXYCODONE HYDROCHLORIDE AND ACETAMINOPHEN PRN TAB: 5; 325 TABLET ORAL at 16:02

## 2018-12-21 RX ADMIN — IBUPROFEN PRN MG: 600 TABLET ORAL at 16:02

## 2018-12-21 RX ADMIN — OXYCODONE HYDROCHLORIDE AND ACETAMINOPHEN PRN TAB: 5; 325 TABLET ORAL at 08:52

## 2018-12-21 RX ADMIN — IBUPROFEN PRN MG: 600 TABLET ORAL at 16:01

## 2018-12-21 NOTE — OCCUPATIONAL THER DAILY NOTE
OT Current Status-Daily Note


Subjective


Pt seen in room, up in bed, agreeable to OT. No pain mentioned unless he was 

moving his R leg.





Appearance


Easily awakened and cooperative.





Mental Status/Objective


Therapy Code Descriptions/Definitions 





Functional [place] Measure:


0=Not Assessed/NA        4=Minimal Assistance


1=Total Assistance        5=Supervision or Setup


2=Maximal Assistance  6=Modified [place]


3=Moderate Assistance 7=Complete [place]





ADL-Treatment


Pt agreed to ADLs. Transferred to sit EOB with help to move R leg, bed raised 

up significantly. Pt able to sit EOB without losing his balance. Pt took shirt 

off/on with supervision and washed/dried face, hands, arms, chest, abdomen, 

with sponge bath. He needed to toilet and needed max assist to come to standing 

at EOB, FWW, cues for hand placement. Stand pivot transfer to [facility] with min 

assist to turn and use walker. Cues to not sit before ready. Pt was able to 

push pants down and needed mod assist to sit on commode, 2 people present for 

safety. He was able to place and hold urinal. Able to wash makenzie front and upper 

legs but not bottom and lower legs. Unable to help get pants off lower legs/

feet or put clean ones on. Unable to change socks. pants stuck to velcro of R 

leg immobilizer and talked with his wife about bringing in shorts. Pt left on 

toilet, R leg propped up for comfort, nursing present.


Therapy Code Descriptions/Definitions 





Functional [place] Measure:


0=Not Assessed/NA        4=Minimal Assistance


1=Total Assistance        5=Supervision or Setup


2=Maximal Assistance  6=Modified [place]


3=Moderate Assistance 7=Complete [place]








Therapy Quality Codes:


6    Independent with activity with or without an assistive device


5    Patient requires set up or clean up by helper.  Patient completes activity

  by  themselves


4    Supervision or touching assist (CGA). [place] provide cues , steadying 

assist


3    The helper provides less than half the effort to complete the activity


2    The helper provides more than half the effort to complete the activity


1    Dependent.  The helper does all the effort to complete an activity 


7    Patient refused to complete or attempt activity


9    The patient did not perform the activity before the current illness or 

injury


88  Not attempted due to Medical conditions or safety concerns


Bathing (FIM):  3 (70%. Sponge bath at EOB)


Bathing Location:  L Arm, R Arm, L Upper Leg, R Upper Leg, Chest, Abdomen, 

Perineal Area


Shower/Bathe Self (QC):  3


Upper Body (FIM):  5 (supervision)


Upper Body Dressing (QC):  4


Lower Body Dressing (FIM):  1 (Unable to get pants on/off legs or feet. Able to 

pull pants down over hips but with two person assist. Unable to get slipper 

socks off/on)


Lower Body Dressing (QC):  1


On/Off Footwear (QC):  1


Toileting (FIM):  1 (Two person assist to manage clothing, hygiene. BSC over 

toilet)


Toileting Hygiene (QC):  1


Toilet/Commode Transfer (FIM):  1 (Two person assist for getting on/off BSC, FWW

)


Toilet Transfer (QC):  1





Education


OT Patient Education:  Modified ADL techniques, Progress toward Goal/Update tx 

plan, Purpose of tx/functional activities, Safety issues, Transfer techniques


Teaching Recipient:  Patient


Teaching Methods:  Demonstration, Discussion


Response to Teaching:  Verbalize Understanding, Return Demonstration, 

Reinforcement Needed





OT Short Term Goals


Short Term Goals


1=Demonstrate adherence to instructed precautions during ADL tasks.


2=Patient will verbalize/demonstrate understanding of assistive devices/

modifications for ADL.


3=Patient will improve strength/tolerance for activity to enable patient to 

perform ADL's.





OT Long Term Goals


Long Term Goals


1=Demonstrate adherence to instructed precautions during ADL tasks.


2=Patient will verbalize/demonstrate understanding of assistive devices/

modifications for ADL.


3=Patient will improve strength/tolerance for activity to enable patient to 

perform ADL's.





OT Education/Plan


Problem List/Assessment


Pt would benefit from skilled OT to increase his independence in basic self 

care.





Discharge Recommendations


Plan/Recommendations:  Continue POC





Treatment Plan/Plan of Care


Treatment,Training & Education:  Yes


Patient would benefit from OT for education, treatment and training to promote 

independence in ADL's, mobility, safety and/or upper extremity function for ADL'

s.


Plan of Care:  ADL Retraining, Functional Mobility, Group Exercise/Act as Ind (

education, exercise, socialization, funct mobility, memory), UE Funct Exercise/

Act, UE Neuromus Re-Ed/Coord


Treatment Duration:  Jan 12, 2019


Frequency:  At least 5 of 7 days/Wk (IRF)


Estimated Hrs Per Day:  1.5 hours per day (1.25 to 1.5)


Agreement:  Yes


Rehab Potential:  Fair





Time/GCodes


Start Time:  14:00


Stop Time:  14:30


Total Time Billed (hr/min):  30


Billed Treatment Time


visit, 30 minutes ADL











PARUL KNOWLES OT Dec 21, 2018 15:18

## 2018-12-21 NOTE — PHYSICAL THERAPY DAILY NOTE
PT Daily Note-Current


Subjective


Patient agrees to PT.





Pain





   Numeric Pain Scale:  5-Moderate Pain


   Location:  Right


   Location Body Site:  Knee


   Pain Description:  Acute





Mental Status


Patient Orientation:  Confused





Transfers


Therapy Code Descriptions/Definitions 





Functional [place] Measure:


0=Not Assessed/NA        4=Minimal Assistance


1=Total Assistance        5=Supervision or Setup


2=Maximal Assistance  6=Modified [place]


3=Moderate Assistance 7=Complete [place]








Therapy Quality Codes:


6    Independent with activity with or without an assistive device


5    Patient requires set up or clean up by helper.  Patient completes activity

  by  themselves


4    Supervision or touching assist (CGA). [place] provide cues , steadying 

assist


3    The helper provides less than half the effort to complete the activity


2    The helper provides more than half the effort to complete the activity


1    Dependent.  The helper does all the effort to complete an activity 


7    Patient refused to complete or attempt activity


9    The patient did not perform the activity before the current illness or 

injury


88  Not attempted due to Medical conditions or safety concerns


Transfers (B, C, W/C) (FIM):  4


Scootin


Rollin


Roll Left to Right (QC):  4


Supine to/from Sit:  4


Sit to/from Stand:  4


Sit to Lying (QC):  4


Sit to Stand (QC):  4


Chair/Bed-to-Chair Xfer(QC):  4


Bed to/from Chair:  4


minimal assist from elevated bed and hip chair due to patient height and 

decreased right knee ROM with knee immobilizer in place





Weight Bearing


Right Lower Extremity:  Right


Full Weight Bearing


Left Lower Extremity:  Left


Full Weight Bearing





Gait Training


Does the Patient Walk?:  Yes


Gait (FIM):  4


Distance (FIM):  3=150 ft


Distance:  250' x 2


Walk 10 feet (QC):  4


Walk 50 ft with 2 Turns(QC):  4


Walk 150 ft (QC):  4


Gait Level of Assist:  4


Gait Persons Needed:  1


Gait Assistive Device:  FWW


slow, step to gait sequence with skilled verbal instruction for body placement 

in FWW due to extended UE's





Assessment


Patient progressing with treatment plan.  Patient returned to bed with 4 rails 

up and bed alarm activated for patient safety.





PT Long Term Goals


Long Term Goals


PT Long Term Goals Time Frame:  2019


Transfers (B,C,W/C) (FIM):  5


Sit to Lying (QC):  5


Lying-Sitting on Side/Bed(QC):  5


Sit to Stand (QC):  5


Rollin


Roll Left to Right (QC):  5


Chair/Bed-to-Chair Xfer(QC):  5


Car Transfer (QC):  5


Does the Patient Walk:  Yes


Gait (FIM):  5


Gait distance (FIM):  3=150 ft


Distance:  150'


Walk 10 feet (QC):  5


Walk 10ft-Uneven Surface(QC):  5


Walk 50ft with 2 Turns (QC):  5


Walk 150 ft (QC):  5


Gait Level of Assist:  5


Gait Assistive Device:  FWW


Stairs (FIM):  5


# of Steps:  6


1 Step (curb) (QC):  5


4 Steps (QC):  5


12 Steps (QC):  88


Stairs Level Of Assist:  5


Picking up an Object (QC):  5





PT Plan


Treatment/Plan


Treatment Plan:  Continue Plan of Care


Treatment Plan:  Bed Mobility, Education, Functional Activity Deepti, Functional 

Strength, Group Therapy, Gait, Safety, Therapeutic Exercise, Transfers


Treatment Duration:  2019


Frequency:  At least 5 of 7 days/Wk (IRF)


Estimated Hrs Per Day:  1.5 hours per day


Patient and/or Family Agrees t:  Yes





Time/GCodes


Time In:  1300


Time Out:  1325


Total Billed Treatment Time:  25


Total Billed Treatment


1 visit


GT x 2 25 min











CHANDRA BURK PT Dec 21, 2018 13:32

## 2018-12-21 NOTE — HISTORY & PHYSICIAL
History of Present Illness


History of Present Illness


Reason for visit/HPI


Patient had surgery by orthopedics on 2018.


Patient had right sided knee pain, catching, locking and swelling.


Patient fell down 8 times in one day.


Patient unable to actively extend right knee since that time.


An MRI revealed medial and lateral miniscus tears.


Past medical history COPD and asthma.


Past surgeries, skin cancer excision, gallbladder, and appendectomy.


Family history cancer and cardiovascular disease.


Date of Admission


Dec 21, 2018 at 09:31


Time Seen by a Provider:  10:21


I consulted on this patient on


18


 10:21


Attending Physician


Michele Arrieta MD


Admitting Physician


Brandon Mccray MD


Consult








Allergies and Home Medications


Allergies


Coded Allergies:  


     Penicillins (Unverified  Allergy, HIVES, 3/8/11)





Home Medications


Fluticasone/Vilanterol 1 Each Blst.w.dev, 1 EACH IH DAILY, (Reported)


Ibuprofen 600 Mg Tablet, 600 MG PO BID PRN for PAIN, (Reported)


Oxycodone HCl/Acetaminophen 1 Each Tablet, 1 EACH PO Q4H PRN for PAIN-MODERATE


   Prescribed by: RENATA WHITE on 18 1331





Patient Home Medication List


Home Medication List Reviewed:  Yes





Past Medical-Social-Family Hx


Patient Social History


Marrital Status:  


Employed/Student:  retired


Alcohol Use:  Denies Use


Recreational Drug Use:  No


Smoking Status:  Never a Smoker


Physical Abuse Screen:  No


Sexual Abuse:  No


Recent Foreign Travel:  No


Contact w/other who traveled:  No


Recent Hopitalizations:  No


Recent Infectious Disease Expo:  No





Immunizations Up To Date


Pediatric:  Yes


Date of Pneumonia Vaccine:  Dec 17, 2015


Date of Influenza Vaccine:  Oct 1, 2018





Seasonal Allergies


Seasonal Allergies:  Yes





Surgeries


Yes (SKIN CANCERS REMOVED)


Appendectomy, Gallbladder





Respiratory


Yes





Cardiovascular


No





Neurological


No





Reproductive System


Hx Reproductive Disorders:  No


Sexually Transmitted Disease:  No





Genitourinary


No


Kidney Stones





Gastrointestinal


Yes


Gastroesophageal Reflux, Chronic Constipation





Musculoskeletal


Yes (r knee pain-r/t recent fall on concrete)





Endocrine


History of Endocrine Disorders:  No





HEENT


History of HEENT Disorders:  No


HEENT Disorders:  Cataract





Cancer


No





Psychosocial


History of Psychiatric Problem:  No





Integumentary


History of Skin or Integumenta:  No





Blood Transfusions


History of Blood Disorders:  No


Adverse Reaction to a Blood Tr:  No (N/A)





Family Medical History


Family Hx:  


Patient reports no known family medical history.





Review of Systems


Constitutional:  weakness


EENTM:  no symptoms reported


Respiratory:  no symptoms reported


Cardiovascular:  no symptoms reported


Gastrointestinal:  no symptoms reported


Genitourinary:  no symptoms reported





Physical Exam


Vital Signs


Capillary Refill :


Height, Weight, BMI


Height: 6'6.00"


Weight: 232lbs. 2.0oz. [id number]; 26.8 BMI


Method:


General Appearance:  No Apparent Distress, WD/WN, Thin


Eyes:  Bilateral Eye Normal Inspection


HEENT:  Normal ENT Inspection


Neck:  Full Range of Motion, Non Tender


Respiratory:  Chest Non Tender, No Accessory Muscle Use, No Respiratory Distress


Cardiovascular:  Regular Rate, Rhythm, No Murmur


Gastrointestinal:  Non Tender, Soft





Assessment/Plan


Assessment and Plan


Right quadriceps tendon disruption with medial and lateral miniscus tears.


COPD





Admission Diagnosis


Admission Status:  Inpatient Order (span 2 midnights)


Reason for Inpatient Admission:  


Needs physical and occupational therapy, recent knee surgery





Clinical Quality Measures


DVT/VTE Risk/Contraindication:


Risk Factor Score Per Nursin


RFS Level Per Nursing on Admit:  4+=Very High











JERONIMO OLIVARES DO Dec 21, 2018 10:26

## 2018-12-21 NOTE — PHYSICAL THERAPY EVALUATION
PT Evaluation-General


Medical Diagnosis


Admission Date


Dec 21, 2018 at 09:31


Medical Diagnosis:  right patellar tendon repair


Onset Date:  Dec 19, 2018





Therapy Diagnosis


Therapy Diagnosis:  generalized weakness/debility





Height/Weight


Height (Feet):  6


Height (Inches):  6.00


Weight (Pounds):  232


Weight (Ounces):  2.0





Precautions


Precautions/Isolations:  Fall Prevention, Standard Precautions





Weight Bear Status


Right Lower Extremity:  Right


Full Weight Bearing


Left Lower Extremity:  Left


Full Weight Bearing





Referral


Physician:  Berny


Reason for Referral:  Evaluation/Treatment





Medical History


Pertinent Medical History:  COPD, Dementia


Additional Medical History


multiple falls from 2018 to now resulting in right patellar tendon 

tear with repair


Current History


transfer to ARU


Reviewed History:  Yes





Social History


Home:  Single Level


Current Living Status:  Spouse


Entry Into Home:  Stairs Without Railing


PT Steps Into Home:  2 (has pole he pulls up on to assist with steps)





Prior/Core FIM


Prior Level of Function


Therapy Code Descriptions/Definitions 





Functional [place] Measure:


0=Not Assessed/NA        4=Minimal Assistance


1=Total Assistance        5=Supervision or Setup


2=Maximal Assistance  6=Modified [place]


3=Moderate Assistance 7=Complete [place]








Therapy Quality Codes:


6    Independent with activity with or without an assistive device


5    Patient requires set up or clean up by helper.  Patient completes activity

  by  themselves


4    Supervision or touching assist (CGA). [place] provide cues , steadying 

assist


3    The helper provides less than half the effort to complete the activity


2    The helper provides more than half the effort to complete the activity


1    Dependent.  The helper does all the effort to complete an activity 


7    Patient refused to complete or attempt activity


9    The patient did not perform the activity before the current illness or 

injury


88  Not attempted due to Medical conditions or safety concerns





Functional Abilities and Goals:


Independent: Patient completed the activities by him/herself, with or without 

an assistive device, with no assistance from a helper.


Needed Some Help: Patient needed partial assistance from another person to 

complete activities.


Dependent: A helper completed the activities for the patient. 


Unknown:


Not Applicable:


Bed Mobility:  6


Transfers (B,C,W/C) (FIM):  6


Gait:  6


Stairs:  6


Indoor Mobility (Ambulation):  Independent


Stairs:  Independent


Prior Device Use:  crutches since 2018





PT Evaluation-Current


Subjective


Patient agrees to PT.  C/o 10/10 right LE pain.





Pain





   Numeric Pain Scale:  10-Worst Possible Pain


   Location:  Right


   Location Body Site:  Knee


   Pain Description:  Acute





Objective


Patient Orientation:  Confused


Problem Solving:  Fair





ROM/Strength


ROM Lower Extremities


right LE decreased due to knee immobilizer in place/left LE WFL


Strenght Lower Extremities


left LE 4-/5 grossly/ right L 3-/5 grossly





Integumentary/Posture


Integumentary


refer to nursing notes


Bowel Incontinence:  No


Bladder Incontinence:  No


Posture


trunk flexed posture





Neuromuscular


(Tone, Coordination, Reflexes)


slightly diminished coordination due to inactivity





Sensory


Vision:  Wears Glasses


Hearing:  Functional


Sensation Right Lower Extremit:  Intact


Sensation Left Lower Extremity:  Intact





Transfers


Therapy Code Descriptions/Definitions 





Functional [place] Measure:


0=Not Assessed/NA        4=Minimal Assistance


1=Total Assistance        5=Supervision or Setup


2=Maximal Assistance  6=Modified [place]


3=Moderate Assistance 7=Complete [place]








Therapy Quality Codes:


6    Independent with activity with or without an assistive device


5    Patient requires set up or clean up by helper.  Patient completes activity

  by  themselves


4    Supervision or touching assist (CGA). [place] provide cues , steadying 

assist


3    The helper provides less than half the effort to complete the activity


2    The helper provides more than half the effort to complete the activity


1    Dependent.  The helper does all the effort to complete an activity 


7    Patient refused to complete or attempt activity


9    The patient did not perform the activity before the current illness or 

injury


88  Not attempted due to Medical conditions or safety concerns


Transfers (B, C, W/C) (FIM):  2


Scootin


Rolling:  3


Roll Left to Right (QC):  3


Supine to/from Sit:  3


Sit to/from Stand:  2 (from low surface)


Sit to Lying (QC):  3


Lying to Sitting/Side of Bed(Q:  3


Sit to Stand (QC):  2


Chair/Bed-to-Chair Xfer(QC):  3


Car Transfer (QC):  3


assist from low position/chair patient has difficulty with right LE mobility 

due to pain and confusion





Gait


Does the Patient Walk?:  Yes


Mode of Locomotion:  Walk


Anticipated Mode of Locomotion:  Walk


Gait (FIM):  2


Distance (FIM):  5=829-35 ft


Walk 10 feet (QC):  4


Walk 50 ft with 2 Turns(QC):  4


Walk 150 ft (QC):  88


Walking 10ft/uneven surface-QC:  4


Distance:  75' x 5


Gait Level of Assist:  4


Gait Persons Needed:  1


Gait Assistive Device:  FWW


Comments/Gait Description


trunk flexed posture with FWW with patient requiring skilled verbal instruction 

for body placement in FWW





Stairs


Stairs (FIM):  1


#of Steps:  1


Level of Assist:  4


1 Step (curb) (QC):  4


4 Steps (QC):  88


Assistive Device:  Walker


12 Steps (QC):  88





Balance


Sitting Static:  Fair


Sitting Dynamic:  Fair


Standing Static:  Fair


 Standing Dynamic:  Fair





Assessment/Needs


78 y.o. male, will benefit from skilled PT to address functional strength and 

mobility to improve current LOF.  Patient has dementia, per spousal report, he 

has difficulty with safety concerns and following direction.  Bed alarm and 

chair alarm activated as needed.


Rehab Potential:  Fair


Post Rehab Potential-Barriers:  compliance





PT Long Term Goals


Long Term Goals


PT Long Term Goals Time Frame:  2019


Transfers (B,C,W/C) (FIM):  5


Sit to Lying (QC):  5


Lying-Sitting on Side/Bed(QC):  5


Sit to Stand (QC):  5


Rollin


Roll Left to Right (QC):  5


Chair/Bed-to-Chair Xfer(QC):  5


Car Transfer (QC):  5


Does the Patient Walk:  Yes


Gait (FIM):  5


Gait distance (FIM):  3=150 ft


Distance:  150'


Walk 10 feet (QC):  5


Walk 10ft-Uneven Surface(QC):  5


Walk 50ft with 2 Turns (QC):  5


Walk 150 ft (QC):  5


Gait Level of Assist:  5


Gait Assistive Device:  FWW


Stairs (FIM):  5


# of Steps:  6


1 Step (curb) (QC):  5


4 Steps (QC):  5


12 Steps (QC):  88


Stairs Level Of Assist:  5


Picking up an Object (QC):  5





PT Plan


Problem List


Problem List:  Activity Tolerance, Functional Strength, Safety, Balance, Gait, 

Transfer, Bed Mobility, ROM





Treatment/Plan


Treatment Plan:  Continue Plan of Care


Treatment Plan:  Bed Mobility, Education, Functional Activity Deepti, Functional 

Strength, Group Therapy, Gait, Safety, Therapeutic Exercise, Transfers


Treatment Duration:  2019


Frequency:  At least 5 of 7 days/Wk (IRF)


Estimated Hrs Per Day:  1.5 hours per day


Patient and/or Family Agrees t:  Yes





Discharge Recommendations


Therapy D/C Recommendations:  Home w/ Family Support, Physical Therapy Home Care

, Nursing Home Placement





Time/GCodes


Time In:  958


Time Out:  1100


Total Billed Treatment Time:  62


Total Billed Treatment


1 [facility]


GT x 2 30 min











CHANDRA BURK PT Dec 21, 2018 11:04

## 2018-12-21 NOTE — NUR
Admitted to room 224-1, with an admitting diagnosis of debility , on 12/21/18 from [address]  via 
, accompanied by .NYDIA GRANADO introduced to surroundings, call light, bed controls, 
phone, TV, temperature control, lights, meal times, smoking policy, visitor policy, side 
rail policy, bathrooms and showers.  Patient Rights given to patient in the 
handbook.NYDIA GRANADO verbalizes understanding that [facility] is not responsible for the 
loss or damage to any personal effects or valuables that are kept in the patients posession 
during their hospitalization.  The following Patient Care Plans were discussed with the : 
Discharge Planning, [impaired mobility ,[self care deficit], and [alteration in skin 
integrity]. NYDIA GRANADO verbalizes understanding of Interdisciplinary Patient Education. 
Patient and/or family were informed about the Rapid Response Team and its purpose. Patient 
received Patient Rights Booklet, which includes Privacy Act Statement and Data Collection 
Information Summary.

## 2018-12-21 NOTE — BEHAVIORAL HEALTH CONSULT
Consult-


Consult


Date Seen by Provider:  Dec 21, 2018


Time Seen by Provider:  14:50


Patient: Uzair Ribera   : 1940


Date: 2018   Referral:  Dr. De Anda


CPT Code: 42492 Psychodiagnostic Examination and 97809 Interactive Complexity, 

1 unit(s)   


Start Time: 1450   Stop Time:   1615


Chief Complaint: Depression and suicidal statements





Referral: Uzair Ribera is a 78 year old male referred by Dr. De Anda for a 

clinical diagnostic assessment.  Information sources for this evaluation 

include self-report/observation and spouse.  





Presenting Problem:  The presenting clinical problem is Depression with 

suicidal comments.  The primary clinical theme and problem discussed during the 

appointment was that Mr. Ribera was admitted to the rehabilitation unit at [facility] following surgery to his knee.  He had made some comments to one of the 

therapists the he had a death wish and had several ways to carry it out when he 

went home.  This provider met with Mr. Ribera and his wife, Nicole.  She 

reported that he has had these comments before when he is lonely and in pain.  

He reportedly has a low tolerance for pain.  Mr. Ribera acknowledged that he 

said these things, but denied that he would ever off himself.  He stated that 

he does get depressed because all his friends are dead or gone and his family 

is far away.  He gets lonely when his wife travels to [place] frequently to 

visit her 98-year-old mother.  Mr. Ribera stated that as long as he had Nicole 

he would never hurt himself.  He was very talkative and enjoyed sharing stories 

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  Mr. Ribera has been on 

Zoloft in the past and did not like it because it caused him to have diarrhea.  

He has been started on Lexapro and he states that he will give this a try.  

Nicole shared that he has been showing more problems with confusion, especially 

after the anesthesiology after surgery.  She would like more information about 

medication for dementia.  Symptoms observed or reported requiring current level 

of care include depressed mood. 





Observations/Mental Status: Mr. Banks general approach to the evaluation 

indicated interest.  Orientation was intact for person, place, time, and 

situation. Mr. Ribera evidenced good understanding of the reason for the 

appointment.  Mr. Banks approach to the session was cooperative.  The 

predominant mood was that of depressed with affect appropriate to expressed 

concerns and presenting problem. Immediate attention and concentration was 

grossly intact.  Level of intellectual functioning compared to same age peers 

was average range.  Thought processes were found to be generally logical, 

coherent and goal directed. Thought content appeared normal.  Tone of voice was 

normal and controlled and manner of speech was normal.  Expressive speech was 

marked by fluent speech and language.  Current destructive behavior patterns: 

suicide threats.  Disturbance in sleep patterns: none reported or indicated.  

Eye contact was good. Insight was average. 








Family and Social Histories: Mr. Ribera currently lives with his wife, Nicole 

of 53 years.  She states that she is not concerned with his recent comments of 

self-harm and has heard them before.  She does agree to start Lexapro and would 

also be interested in learning more about medication for dementia. 


Summary of Assessment Information/Prognosis: Mr. Banks presenting problem 

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 

Specified Depressive Disorder at this point.  Current emotional symptoms are of 

moderate intensity.  





Diagnostic Impressions:


         ICD-10:


            F32.89 Other Specified Depressive Disorder











ZAIN MONTE [facility] Dec 21, 2018 16:10

## 2018-12-21 NOTE — OCCUPATIONAL THERAPY EVAL
OT Evaluation-General/PLF


Medical Diagnosis


Admission Date


Dec 21, 2018 at 09:31


Medical Diagnosis:  right patellar tendon repair


Onset Date:  Dec 19, 2018





Therapy Diagnosis


Therapy Diagnosis:  decr self care, weakness, decr funct mob, decr act antione, 

decr cognition





Height/Weight


Height (Feet):  6


Height (Inches):  6.00


Weight (Pounds):  232


Weight (Ounces):  2.0





Precautions


Precautions/Isolations:  Fall Prevention, Standard Precautions





Weight Bear Status


Weight Bearing Restriction:  Weight Bearing/Tolerated


Location Restriction:  R LE





Referral


Physician:  Berny


Referral Reason:  Evaluation/Treatment





Medical History


Pertinent Medical History:  COPD, Dementia, GERD


Additional Medical History


Asthma. Skin cancer. Chronic constipation. Kidney stones.


Current History


Multiple fall at home, resulting in patellar tendon tear. Repair on 18. 

Wearing immobilizer which restricts knee flexion.


Reviewed History:  Yes





Social History


Home:  Single Level


Current Living Status:  Spouse


Entry Into Home:  Stairs Without Railing


 Steps Into Home:  2 (has pole he pulls up on to assist with steps)





ADL-Prior Level of Function


Therapy Code Descriptions/Definitions 





Functional [place] Measure:


0=Not Assessed/NA        4=Minimal Assistance


1=Total Assistance        5=Supervision or Setup


2=Maximal Assistance  6=Modified [place]


3=Moderate Assistance 7=Complete [place]








Therapy Quality Codes:


6    Independent with activity with or without an assistive device


5    Patient requires set up or clean up by helper.  Patient completes activity

  by  themselves


4    Supervision or touching assist (CGA). [place] provide cues , steadying 

assist


3    The helper provides less than half the effort to complete the activity


2    The helper provides more than half the effort to complete the activity


1    Dependent.  The helper does all the effort to complete an activity 


7    Patient refused to complete or attempt activity


9    The patient did not perform the activity before the current illness or 

injury


88  Not attempted due to Medical conditions or safety concerns





Functional Abilities and Goals:


Independent: Patient completed the activities by him/herself, with or without 

an assistive device, with no assistance from a helper.


Needed Some Help: Patient needed partial assistance from another person to 

complete activities.


Dependent: A helper completed the activities for the patient. 


Unknown:


Not Applicable:


ADL PLOF Comments


Pt reported that he was previously able to manage all of his basic self care 

needs. He said that he also mows his yard with a brush hog, operates backhoe at 

home, drives. 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 He said 

that he did not need help with managing his medications or bills. He has 

several cats that he feeds outside.


Self Care:  Independent


Functional Cognition:  Independent


DME/Equipment:  Bath Chair, Grab Bars (maybe in shower), Shower, Tall Toilet





OT Current Status


Subjective


Pt seen inroom, up in bed, agreeable to OT. Pain reported 7/10 in R knee but 

not described. Declined OT asking for pain meds.





Appearance


Alert, cooperative, a little hard of hearing. Likes to talk and share 

experiences. Confused, sometimes giving different answers.





Mental Status/Objective


Patient Orientation:  Person, Confused, Place, Time (Gave year with cues, month 

with cues)


Attachments:  Saline Lock, Other-See Comments (knee immobilizer)





Current


Glasses/Contacts:  Yes


Hearing Aids:  No


Dentures/Partials:  Yes


Hand Dominance:  Right


Upper Extremity ROM


Grossly WFL bilat


Upper Extremity Sensation


No problems per pt report


Upper Extremity Strength


Grossly 4/5 bilat





ADL-Treatment


ADL-Current


Pt needed help lifting R leg into bed and moving it in bed. Prepared for bath 

but then declined. Able to feed self with no assistance, including opening 

packages, getting a drink. Pt talked quite a bit about having a "death wish" 

and described how he might kill himself at home. He said that, if his wife dies

, he did not want to live. He stated that he is not thinking about killing 

himself in the hospital and said that he wouldn't kill himself. Pt left up in 

bed, 4 rails up, alarm on, eating. All needs met.


Eating (FIM):  6


Eating (QC):  6





Education


OT Patient Education:  Modified ADL techniques, Purpose of tx/functional 

activities, Rehab process, Safety issues, Transfer techniques


Teaching Recipient:  Patient


Teaching Methods:  Discussion


Response to Teaching:  Verbalize Understanding, Return Demonstration, 

Reinforcement Needed





OT Short Term Goals


Short Term Goals


Time Frame:  Dec 28, 2018


Bathing(FIM):  4


Toileting(FIM):  3


Toilet/Commode Transfer(FIM):  3


Additional Short Term Goals:  1-Demonstrate ADL Tasks, 2-Verbalize Understanding

, 3-ImproveStrength/Deepti


1=Demonstrate adherence to instructed precautions during ADL tasks.


2=Patient will verbalize/demonstrate understanding of assistive devices/

modifications for ADL.


3=Patient will improve strength/tolerance for activity to enable patient to 

perform ADL's.





OT Long Term Goals


Long Term Goals


Time Frame:  2019


Eating (FIM):  6


Eating (QC):  6


Groomin


Oral Hygiene (QC):  6


Bathing(FIM):  5


Shower/Bathe Self (QC):  5


Upper Body Dressing(FIM):  6


Upper Body Dressing (QC):  6


Lower Body Dressing(FIM):  5


Lower Body Dressing (QC):  5


On/Off Footwear (QC):  5


Toileting(FIM):  5


Toileting Hygiene (QC):  5


Toilet/Commode Transfer(FIM):  5


Toilet/Commode Transfer (QC):  5


Shower Transfer(FIM):  5


Additional Goals:  1-Demonstrate ADL Tasks, 2-Verbalize Understanding, 3-

ImproveStrength/Deepti


1=Demonstrate adherence to instructed precautions during ADL tasks.


2=Patient will verbalize/demonstrate understanding of assistive devices/

modifications for ADL.


3=Patient will improve strength/tolerance for activity to enable patient to 

perform ADL's.





OT Education/Plan


Problem List/Assessment


Assessment:  Decreased Activ Tolerance, Decreased Safety Aware, Decreased UE 

Strength, Dependent Transfers, Impaired Cognition, Impaired Self-Care Skills


Pt would benefit from skilled OT to increase his independence in basic self 

care.





Discharge Recommendations


Plan/Recommendations:  Continue POC





Treatment Plan/Plan of Care


Treatment,Training & Education:  Yes


Patient would benefit from OT for education, treatment and training to promote 

independence in ADL's, mobility, safety and/or upper extremity function for ADL'

s.


Plan of Care:  ADL Retraining, Functional Mobility, Group Exercise/Act as Ind (

education, exercise, socialization, funct mobility, memory), UE Funct Exercise/

Act, UE Neuromus Re-Ed/Coord


Treatment Duration:  2019


Frequency:  At least 5 of 7 days/Wk (IRF)


Estimated Hrs Per Day:  1.5 hours per day (1.25 to 1.5)


Agreement:  Yes


Rehab Potential:  Fair





Time/GCodes


Start Time:  11:05


Stop Time:  12:00


Total Time Billed (hr/min):  55


Billed Treatment Time


visit, 40 minutes evaluation high intensity, 15 minutes ADL











PARUL KNOWLES OT Dec 21, 2018 13:20

## 2018-12-21 NOTE — NUR
[facility] to see pt at this time. Prachi Tamez stated she didn't 
think pt was at risk at this time. Only "if something happened to his wife". Tele in rm and 
functioning. Call light in reach.

## 2018-12-21 NOTE — DISCHARGE SUMMARY
DATE OF SERVICE:  



DIAGNOSES:

1.  Right quadriceps tendon tear.

2.  Dementia.

3.  Debility.

4.  Chronic obstructive pulmonary disease.

5.  Asthma.



PROCEDURE:

Right quadriceps tendon repair.



HISTORY:

The patient is a 78-year-old gentleman who was admitted the day of right

quadriceps tendon repair.  It was scheduled as an outpatient procedure.  He was

unable to ambulate and therefore was admitted due to debility and gait

abnormality.  Postoperatively, he improved, but still had not cleared physical

therapy; therefore, it was recommended that the patient be transferred to the

acute rehabilitation facility.



CONDITION ON DISCHARGE:

Good.



DISPOSITION:

Transferred to inpatient rehabilitation facility.





Job ID: [id number]

DocumentID: [id number]

Dictated Date:  12/20/2018 20:08:00

Transcription Date: 12/21/2018 16:01:52

Dictated By: SHILOH LOBO MD

## 2018-12-21 NOTE — ST COGNITIVE LINGUISTIC EVAL
Speech Evaluation-General


Medical Diagnosis


right patellar tendon repair


Onset Date:  Dec 19, 2018





Therapy Diagnosis


Therapy Diagnosis:  Cognitive-communication





Precautions


Precautions/Isolations:  Fall Prevention, Standard Precautions





Medical History


Pertinent Medical History:  COPD, Dementia, GERD


Reviewed History:  Yes





Social History


Current Living Status:  Spouse





Speech PLF-Current Status


Prior Level of Function





Patient lived at home with his wife prior to his admit to the ARU post


knee surgery. Patient was independent with most of his daily needs.





Subjective


Patient was pleasant and cooperative with the evaluation process.





Language Eval: Auditory


Comprehends Simple Yes/No Ques:  Functional


Indent/Objects Multiple Fields:  Functional


Ident/Pics in Multiple Fields:  Functional


Follows 1-Step Commands:  Mild


Follows Complex Directions:  Moderate


Follows General Conversations:  Mild





Language Eval: Verbal Language


Completes Spontaneous Greeting:  Functional


Produces Auto, Serial Info:  Functional


Imitates Simple Words/Phrases:  Functional


Word Finding:  Mild


Requests Basic Needs:  Functional


States Basic Personal Info:  Functional


Expresses Complex Ideas:  Moderate





Objective Cognitive Domain


Attention:  WNL


Memory:  Moderate


Problem Solving:  Moderate


Executive Functions:  Moderate





Objective


Formal/Standardized Tests


[facility] Cognitive/Communication


Results


Memory: Immediate: 3/3, Delayed with cues: 1/3, Problem Solving: Simple 4/5, 

Complex 0/5, Auditory Processing: Simple: 4/5, Complex 1/5, Orientation: 5/5, 

Sequencin/5


Oral Motor/Speech Production


Within Normal Range


Impression


Patient is a 78 year old male who presents with a vocal personality. He 

verbalized that he knew he had a poor memory. Test results indicate he has 

moderate deficits in memory, problem solving, sequencing and functional tasks. 

Due to his deficits, patient is deemed unsafe to be left alone for personal 

needs. Patient would benefit from skilled ST to focus on deficits to improve 

safety awareness and independence.





Communication/Social Cognition


Comprehension:  2


Expression:  3


Social Interaction:  3


Problem Solvin


Memory:  2





Speech Patient Assess


Expression of Ideas/Wants:  Rarely/Never (1)


Brief Interview-Mental Status:  Yes


Repetition of Three Words:  Two (2)


Temporal Orientation: Year:  Missed by more than 5 yrs (0)


Temporal Orientation: Month:  Missed by 6 days-1 month (1)


Temporal Orientation: Day:  Incorrect or No Answer(0)


Recall : Wear to say "Sock":  No, could not recall (0)


Recall : Color:  Yes, after cueing (1)


Recall : Bed:  No, could not recall (0)


Memory/Recall Ability:  That he or she is in a hsp/hsp unit





Speech Short Term Goals


Short Term Goals


Short Term Goals


1) Patient will recall information/procedures related to personal safety with 80

% or greater accuracy.


2) Patient will demo follow through with 1-2 step directions with 80% or 

greater accuracy.


3) Patient will complete memory tasks with 80% or greater accuracy.





Speech Long Term Goals


Long Term Goals


Patient will improve safety awareness and independence for personal needs in 

order to return home safely.





Speech-Plan


Patient/Family Goals


Patient/Family Goals:  


Patient plans to return home with his wife post rehab.





Treatment Plan


Speech Therapy Treatment Plan:  Continue Plan of Care


Patient is recommended for skilled ST due to deficits.


Treatment Duration:  Dec 28, 2018


Frequency:  5 times per week


Estimated Hrs Per Day:  .5 hour per day


Rehab Potential:  Fair


Barriers to Learning:  


Patient has deficits in memory, problem solving and following directions.


Pt/Family Agrees to Plan:  Yes





Safety Risks/Education


Teaching Recipient:  Patient, Significant Other


Teaching Methods:  Discussion


Response to Teaching:  Verbalize Understanding


Education Topics Provided:  


Safety and utilization of call light.





Time


Speech Therapy Time In:  14:45


Speech Therapy Time Out:  15:00


Total Billed Time:  1500


Billed Treatment Time


1, SPSLUPILLO Foote Dec 21, 2018 15:31

## 2018-12-21 NOTE — PROGRESS NOTE-STANDARD
Standard Progress Note


Progress Notes/Assess & Plan


Date Seen by a Provider:  Dec 21, 2018


Time Seen by a Provider:  16:28


Progress/Assessment & Plan


no current complaints


 RLE brace and dressing in place


NVI distally


 s/p R quad repair


ok to wbat in brace











SHILOH LOBO MD Dec 21, 2018 16:29

## 2018-12-22 VITALS — SYSTOLIC BLOOD PRESSURE: 116 MMHG | DIASTOLIC BLOOD PRESSURE: 62 MMHG

## 2018-12-22 VITALS — SYSTOLIC BLOOD PRESSURE: 127 MMHG | DIASTOLIC BLOOD PRESSURE: 67 MMHG

## 2018-12-22 RX ADMIN — IBUPROFEN PRN MG: 600 TABLET ORAL at 08:54

## 2018-12-22 RX ADMIN — OXYCODONE HYDROCHLORIDE AND ACETAMINOPHEN PRN TAB: 5; 325 TABLET ORAL at 08:54

## 2018-12-22 NOTE — THERAPY GROUP DAILY NOTE
Therapy Daily Group Note


Patient Education Topic


Fall Prevention, Other List Below





Exercises


LE Seated Exercise, UE Exercise





Other/Notes


Pt. seen in group therapy this date for socialization, UE exercise, LE exercise

, and education for balance and pain management.  Pt. tolerated treatment well.

  Began treatment with reporting name and verbalizing memory of favorite 

[place] tradition.  Worked on seated exercises for increased endurance and 

overall strength.  Lunch trays came and pt. transferred to table area.  Ate 

with other pt's while therapy provided education for maintaining balance and 

pain.  All needs met after session over.


Start Time:  11:00


Stop Time:  12:00


Total Billed Treatment Time:  60


Total Billed Treatment


1, group 60 min











ANGE MATHIAS CPTA Dec 22, 2018 12:33

## 2018-12-22 NOTE — PROGRESS NOTE-HOSPITALIST
Subjective


HPI/CC On Admission


Date Seen by Provider:  Dec 22, 2018


Time Seen by Provider:  12:15


Subjective/Events-last exam


Patient has labile moods


Had a psychiatric evaluation due to statements that he was going to kill 

himself yesterday


Started on Lexapro


Cardiac murmur noted of which Dr. Mccray his primary care provider had told him 

he has but he does not have a cardiologist


He sees Dr. Zelaya on a regular basis for inhalers for asthma


Bowels are moving


Overall has periodic rude comments about healthcare professionals who are 

taking care of him





Review of Systems


Musculoskeletal:  leg pain





Objective


Exam


Vital Signs





Vital Signs








  Date Time  Temp Pulse Resp B/P (MAP) Pulse Ox O2 Delivery O2 Flow Rate FiO2


 


18 09:00     95 Room Air  


 


18 05:22 99.8 85 20 127/67 (87)    





Capillary Refill : Less Than 3 Seconds


General Appearance:  No Apparent Distress, WD/WN, Chronically ill


Respiratory:  Chest Non Tender, Lungs Clear, Normal Breath Sounds, No Accessory 

Muscle Use, No Respiratory Distress


Cardiovascular:  Regular Rate, Rhythm, No Edema, No Gallop, No JVD, Normal 

Peripheral Pulses, Systolic Murmur


Extremity:  Normal Capillary Refill, Normal Inspection, Normal Range of Motion (

Except right leg), Non Tender, No Calf Tenderness, No Pedal Edema


Neurologic/Psychiatric:  Alert, Oriented x3, No Motor/Sensory Deficits, Normal 

Mood/Affect





Results/Procedures


Lab


Patient resulted labs reviewed.





Assessment/Plan


Assessment and Plan


Assess & Plan/Chief Complaint


Assessment:


Right quadriceps tendon disruption with medial and lateral meniscus tears 

prohibiting ambulation


COPD


Cardiac murmur


Depression


Suicidal statements now he denies he stated it





Plan:


Inpatient rehabilitation protocol


Ambulate as much as possible


Appreciate orthopedics





Diagnosis/Problems


Diagnosis/Problems





(1) Unspecified injury of right quadriceps muscle, fascia and tendon, initial 

encounter


Status:  Acute


(2) COPD (chronic obstructive pulmonary disease)


Status:  Chronic


Qualifiers:  


   COPD type:  unspecified COPD  Qualified Codes:  J44.9 - Chronic obstructive 

pulmonary disease, unspecified


(3) Cardiac murmur


Status:  Chronic





Clinical Quality Measures


DVT/VTE Risk/Contraindication:


Risk Factor Score Per Nursin


RFS Level Per Nursing on Admit:  4+=Very High











REED EGAN DO Dec 22, 2018 12:43

## 2018-12-22 NOTE — PHYSICAL THERAPY DAILY NOTE
PT Daily Note-Current


Subjective


Pt reports knee pain 3/10 at rest 8/10 with movement.





Transfers


Therapy Code Descriptions/Definitions 





Functional Ball Measure:


0=Not Assessed/NA        4=Minimal Assistance


1=Total Assistance        5=Supervision or Setup


2=Maximal Assistance  6=Modified Ball


3=Moderate Assistance 7=Complete Ball








Therapy Quality Codes:


6    Independent with activity with or without an assistive device


5    Patient requires set up or clean up by helper.  Patient completes activity

  by  themselves


4    Supervision or touching assist (CGA). [place] provide cues , steadying 

assist


3    The helper provides less than half the effort to complete the activity


2    The helper provides more than half the effort to complete the activity


1    Dependent.  The helper does all the effort to complete an activity 


7    Patient refused to complete or attempt activity


9    The patient did not perform the activity before the current illness or 

injury


88  Not attempted due to Medical conditions or safety concerns


Transfers (B, C, W/C) (FIM):  4


Supine to/from Sit:  4


Sit to/from Stand:  4


Education on sequence for supine to sit and sit to stand.





Weight Bearing


Right Lower Extremity:  Right


Full Weight Bearing


Left Lower Extremity:  Left


Full Weight Bearing





Gait Training


Distance:  250


Gait Level of Assist:  4


Gait Persons Needed:  1


Gait Assistive Device:  FWW


Ambulate 6 bouts of 250ft using FWW and CGA.  Pt instructed to stand upright 

and stay near frame of the walker.  Education to kick the right leg out before 

sitting in order to avoid knee flexion.





Exercises


Supine Ex:  Ankle pumps, Quad Set, Glut sets, Heel Slides, Short Arc Quads, 

Straight leg raise, Hip abd/add


Supine Reps:  20


Active assist for the right LE as needed.  Exercise that includes knee flexion 

were performed on the (L) side only.





Assessment


Pt progressing with mobility requiring less assist for bed mobility and sit to 

stand.  He will benefit from continued therapy.





PT Long Term Goals


Long Term Goals


PT Long Term Goals Time Frame:  2019


Transfers (B,C,W/C) (FIM):  5


Sit to Lying (QC):  5


Lying-Sitting on Side/Bed(QC):  5


Sit to Stand (QC):  5


Rollin


Roll Left to Right (QC):  5


Chair/Bed-to-Chair Xfer(QC):  5


Car Transfer (QC):  5


Does the Patient Walk:  Yes


Gait (FIM):  5


Gait distance (FIM):  3=150 ft


Distance:  150'


Walk 10 feet (QC):  5


Walk 10ft-Uneven Surface(QC):  5


Walk 50ft with 2 Turns (QC):  5


Walk 150 ft (QC):  5


Gait Level of Assist:  5


Gait Assistive Device:  FWW


Stairs (FIM):  5


# of Steps:  6


1 Step (curb) (QC):  5


4 Steps (QC):  5


12 Steps (QC):  88


Stairs Level Of Assist:  5


Picking up an Object (QC):  5





PT Plan


Treatment/Plan


Treatment Plan:  Continue Plan of Care


Treatment Plan:  Bed Mobility, Education, Functional Activity Deepti, Functional 

Strength, Group Therapy, Gait, Safety, Therapeutic Exercise, Transfers


Treatment Duration:  2019


Frequency:  At least 5 of 7 days/Wk (IRF)


Estimated Hrs Per Day:  1.5 hours per day


Patient and/or Family Agrees t:  Yes





Time/GCodes


Time In:  905


Time Out:  1005


Total Billed Treatment Time:  60


Total Billed Treatment


visit, gait 40 min, ex 20 min











PAUL LUGO PT Dec 22, 2018 11:01

## 2018-12-22 NOTE — OCCUPATIONAL THER DAILY NOTE
OT Current Status-Daily Note


Subjective


Pt alert, lying in bed.  Pt required encouragement to participate in therapy.  

Pt agrees to therapy.  Pt depressed about family.





Mental Status/Objective


Patient Orientation:  Person, Place, Time, Situation


Therapy Code Descriptions/Definitions 





Functional [place] Measure:


0=Not Assessed/NA        4=Minimal Assistance


1=Total Assistance        5=Supervision or Setup


2=Maximal Assistance  6=Modified [place]


3=Moderate Assistance 7=Complete [place]


Attachments:  IV, Other-See Comments (knee brace)





ADL-Treatment


Therapy Code Descriptions/Definitions 





Functional [place] Measure:


0=Not Assessed/NA        4=Minimal Assistance


1=Total Assistance        5=Supervision or Setup


2=Maximal Assistance  6=Modified [place]


3=Moderate Assistance 7=Complete [place]








Therapy Quality Codes:


6    Independent with activity with or without an assistive device


5    Patient requires set up or clean up by helper.  Patient completes activity

  by  themselves


4    Supervision or touching assist (CGA). [place] provide cues , steadying 

assist


3    The helper provides less than half the effort to complete the activity


2    The helper provides more than half the effort to complete the activity


1    Dependent.  The helper does all the effort to complete an activity 


7    Patient refused to complete or attempt activity


9    The patient did not perform the activity before the current illness or 

injury


88  Not attempted due to Medical conditions or safety concerns


Eating (FIM):  6 (Dentures.  Pt able to complete all then use regular utensils 

to eat.)


Eating (QC):  6


Grooming (FIM):  5 (Gathering supplies.  Pt able to open packages/containers to 

complete oral care and other grooming.)


Oral Hygiene (QC):  5


Lower Body Dressing (FIM):  2 (Assist to don/doff clothing over feet then pt 

able to hike over hips in standing with CGA.)


Lower Body Dressing (QC):  2


On/Off Footwear (QC):  2


Toileting (FIM):  3 (CGA in standing to urinate into urinal.  Assist to 

position.  Pt manipulates own clothing.  Assist to dump urinal.)


Toileting Hygiene (QC):  2


Pt requesting denture cream.  After therapy, pt lying in bed with call light/

phone in reach.  All needs met in room.





OT Short Term Goals


Short Term Goals


Time Frame:  Dec 28, 2018


Bathing(FIM):  4


Toileting(FIM):  3


Toilet/Commode Transfer(FIM):  3


Additional Short Term Goals:  1-Demonstrate ADL Tasks, 2-Verbalize Understanding

, 3-ImproveStrength/Deepti


1=Demonstrate adherence to instructed precautions during ADL tasks.


2=Patient will verbalize/demonstrate understanding of assistive devices/

modifications for ADL.


3=Patient will improve strength/tolerance for activity to enable patient to 

perform ADL's.





OT Long Term Goals


Long Term Goals


Time Frame:  2019


Eating (FIM):  6


Eating (QC):  6


Groomin


Oral Hygiene (QC):  6


Bathing(FIM):  5


Shower/Bathe Self (QC):  5


Upper Body Dressing(FIM):  6


Upper Body Dressing (QC):  6


Lower Body Dressing(FIM):  5


Lower Body Dressing (QC):  5


On/Off Footwear (QC):  5


Toileting(FIM):  5


Toileting Hygiene (QC):  5


Toilet/Commode Transfer(FIM):  5


Toilet/Commode Transfer (QC):  5


Shower Transfer(FIM):  5


Additional Goals:  1-Demonstrate ADL Tasks, 2-Verbalize Understanding, 3-

ImproveStrength/Deepti


1=Demonstrate adherence to instructed precautions during ADL tasks.


2=Patient will verbalize/demonstrate understanding of assistive devices/

modifications for ADL.


3=Patient will improve strength/tolerance for activity to enable patient to 

perform ADL's.





OT Education/Plan


Problem List/Assessment


Pt would benefit from skilled OT to increase his independence in basic self 

care.





Discharge Recommendations


Plan/Recommendations:  Continue POC





Treatment Plan/Plan of Care


Patient would benefit from OT for education, treatment and training to promote 

independence in ADL's, mobility, safety and/or upper extremity function for ADL'

s.


Plan of Care:  ADL Retraining, Functional Mobility, Group Exercise/Act as Ind (

education, exercise, socialization, funct mobility, memory), UE Funct Exercise/

Act, UE Neuromus Re-Ed/Coord


Treatment Duration:  2019


Frequency:  At least 5 of 7 days/Wk (IRF)


Estimated Hrs Per Day:  1.5 hours per day (1.25 to 1.5)


Agreement:  Yes


Rehab Potential:  Fair





Time/GCodes


Start Time:  08:00


Stop Time:  09:00


Total Time Billed (hr/min):  60


Billed Treatment Time


1 visit-ADL 4 (60 min)











TAPAN MESA Dec 22, 2018 08:02

## 2018-12-22 NOTE — PROGRESS NOTE-STANDARD
Standard Progress Note


Progress Notes/Assess & Plan


Date Seen by a Provider:  Dec 22, 2018


Time Seen by a Provider:  09:55


Progress/Assessment & Plan


no current complaints


 RLE brace and dressing in place


NVI distally


 s/p R quad repair


ok to wbat in brace


Final Diagnosis


no complaints


ambulating in [place] with PT. gait much improved


s/p R quad tendon repair


continue PT/OT











SHILOH LOBO MD Dec 22, 2018 09:56

## 2018-12-23 VITALS — SYSTOLIC BLOOD PRESSURE: 145 MMHG | DIASTOLIC BLOOD PRESSURE: 72 MMHG

## 2018-12-23 VITALS — DIASTOLIC BLOOD PRESSURE: 71 MMHG | SYSTOLIC BLOOD PRESSURE: 124 MMHG

## 2018-12-23 RX ADMIN — ENOXAPARIN SODIUM SCH MG: 100 INJECTION SUBCUTANEOUS at 15:27

## 2018-12-23 RX ADMIN — FLUTICASONE PROPIONATE AND SALMETEROL XINAFOATE SCH PUFF: 115; 21 AEROSOL, METERED RESPIRATORY (INHALATION) at 08:20

## 2018-12-23 RX ADMIN — OXYCODONE HYDROCHLORIDE AND ACETAMINOPHEN PRN TAB: 5; 325 TABLET ORAL at 06:50

## 2018-12-23 RX ADMIN — IBUPROFEN PRN MG: 600 TABLET ORAL at 09:23

## 2018-12-23 NOTE — PROGRESS NOTE-STANDARD
Standard Progress Note


Progress Notes/Assess & Plan


Date Seen by a Provider:  Dec 23, 2018


Time Seen by a Provider:  10:04


Progress/Assessment & Plan


no current complaints


 RLE brace and dressing in place


NVI distally


 s/p R quad repair


ok to wbat in brace


Final Diagnosis


no complaints


ambulation much improved





Vital Signs








  Date Time  Temp Pulse Resp B/P (MAP) Pulse Ox O2 Delivery O2 Flow Rate FiO2


 


12/23/18 09:00     95 Room Air  


 


12/23/18 08:20     95 Room Air  


 


12/23/18 06:30 98.9       


 


12/23/18 05:29 99.8 84 16 145/72 (96) 95 Room Air  


 


12/22/18 21:00     96 Room Air  


 


12/22/18 16:15 96.8 68 16 116/62 (80) 95 Room Air  














I & O 


 


 12/23/18





 07:00


 


Intake Total 740 ml


 


Output Total 575 ml


 


Balance 165 ml





RLE--incision clean and dry. no calf tenderness. Neg Lyn's


s/p R quad repair


continue PT/OT











SHILOH LOBO MD Dec 23, 2018 10:06

## 2018-12-23 NOTE — NUR
Notified Dr Carranza in regards to DVT proph. New order for lovenox 40mg SQ daily if ok with 
Dr Cottrell. Dr Cottrell notified and ok'd

## 2018-12-24 VITALS — DIASTOLIC BLOOD PRESSURE: 80 MMHG | SYSTOLIC BLOOD PRESSURE: 156 MMHG

## 2018-12-24 VITALS — SYSTOLIC BLOOD PRESSURE: 148 MMHG | DIASTOLIC BLOOD PRESSURE: 71 MMHG

## 2018-12-24 RX ADMIN — OXYCODONE HYDROCHLORIDE AND ACETAMINOPHEN PRN TAB: 5; 325 TABLET ORAL at 20:05

## 2018-12-24 RX ADMIN — FLUTICASONE PROPIONATE AND SALMETEROL XINAFOATE SCH PUFF: 115; 21 AEROSOL, METERED RESPIRATORY (INHALATION) at 07:32

## 2018-12-24 RX ADMIN — OXYCODONE HYDROCHLORIDE AND ACETAMINOPHEN PRN TAB: 5; 325 TABLET ORAL at 08:10

## 2018-12-24 RX ADMIN — ENOXAPARIN SODIUM SCH MG: 100 INJECTION SUBCUTANEOUS at 09:00

## 2018-12-24 NOTE — PM&R PROGRESS NOTE
Subjective


This was a face to face visit with the patient.


Date Seen by Provider:  Dec 24, 2018


Time Seen by Provider:  19:00


Subjective/Events-last exam


Patient was seen in his room this evening Appreciate Therapy notes Patient min 

assist for transfers with knee brace


Date Identified:  Dec 24, 2018


Time Identified:  19:00


Medication Intervention:  


Current meds reviewed


Review of Systems


Musculoskeletal:  leg pain





Objective


Physician Exam


Last Set of Vital Signs





Vital Signs








  Date Time  Temp Pulse Resp B/P (MAP) Pulse Ox O2 Delivery O2 Flow Rate FiO2


 


12/24/18 16:19 97.9 70 16 148/71 (96) 97 Room Air  





Capillary Refill : Less Than 3 Seconds


I&O











Intake and Output 


 


 12/24/18





 00:00


 


Intake Total 1590 ml


 


Output Total 1475 ml


 


Balance 115 ml


 


 


 


Intake Oral 1590 ml


 


Output Urine Total 1475 ml








General:  Alert, Cooperative, No Acute Distress


HEENT:  Atraumatic, PERRLA, EOMI, Mucous Memb Moist/Pink


Neck:  Supple, No JVD


Lungs:  Clear to Auscultation


Heart:  Regular Rate


Abdomen:  Normal Bowel Sounds, Soft, No Tenderness


Extremities:  Other (rt knee brace)


Neuro:  Other (strength impaired s/p rt Quad repair)





Assessment/Plan


Assessment and Plan


Fall with rt Quad tear s/p repair DR Cottrell


DVT Prophylaxis 


Mild cognitive deficits


Plan Continue PT/OT


Appreciate DR Vela note as wel as Behav Health and [place]


Team Conference 12-26-18


Co-Morbidities that are continuing to impact the rehab process: (include details

)











JED BEAR MD Dec 24, 2018 19:09

## 2018-12-24 NOTE — INDIVIDUALIZED PLAN OF CARE
Individualized Plan of Care


Rehab Nursing IPOC Order


Admission Date


Dec 21, 2018 at 09:31


Current Orders





Orders


Pt Evaluate/Treat Request (12/21/18 09:04)


Request Ot Evaluate & Treat (12/21/18 09:04)


Request For Cognitive Services (12/21/18 09:04)


Admission Order(Inpt,Obs,Sdc) (12/21/18 09:28)


Admission Arrival Bed Request (12/21/18 09:31)


General/Regular (12/21/18 Breakfast)


Oxycodone/Apap 5/325mg Tablet (Percocet (12/21/18 10:00)


Ibuprofen  Tablet (Motrin  Tablet) (12/21/18 10:00)


-Inpt Rehab Con (12/21/18 09:58)


General/Regular (12/21/18 Lunch)


Ice: Apply To Affected Area (12/21/18 09:58)


Sequential Compression Device 08,20 (12/21/18 09:58)


Fluticasone/Salmeterol Common (Advair 11 (12/21/18 10:06)


Ambulate 08,12,20 (12/21/18 10:15)


Sequential Compression Device 08,20 (12/21/18 10:15)


Dvt/Vte Risk - Notifiy Physici 08 (12/21/18 10:15)


(Nf) Lexapro (12/21/18 13:45)


Behavorial Health Consult (12/21/18 13:45)


Citalopram Tablet (Celexa Tablet) (12/21/18 14:00)


Patient Visit (12/21/18 )


Pt Eval High Complexity (12/21/18 )


Gait Training, Ea 15 Min (12/21/18 )


Patient Visit (12/21/18 )


Speech Sound Lang Comp (12/21/18 )


Consult Physician (12/21/18 16:17)


Patient Visit (12/22/18 )


Gait Training, Ea 15 Min (12/22/18 )


Exercise Therap, Ea 15 Min (12/22/18 )


Patient Visit (12/22/18 )


Therapeutic, Group (12/22/18 )


Fluticasone/Salmeterol Common (Advair 11 (12/23/18 08:00)


Enoxaparin Injection (Lovenox Injection) (12/23/18 14:45)


Patient Visit (12/24/18 )


Gait Training, Ea 15 Min (12/24/18 )


Exercise Therap, Ea 15 Min (12/24/18 )


Functional Activities, Ea 15 (12/24/18 )


Patient Visit (12/24/18 )


Sp Therapeutic Group (12/24/18 )





Rehab Nursing Orders:  Disease Management & Educaiton, DVT Prophylaxis, Fall 

Prevention, Medication Management & Education, Management of Risks & 

Complications, Management of Skin Intergrity, Nutrition Management, Pain 

Management, Patient/Family Support, Safety Management, Weight Bearing Precaution

, Wound Management





PT IPOC


Problem List:  Activity Tolerance, Functional Strength, Safety, Balance, Gait, 

Transfer, Bed Mobility, ROM


Treatment Plan:  Continue Plan of Care


Bed Mobility, Education, Functional Activity Deepti, Functional Strength, Group 

Therapy, Gait, Safety, Therapeutic Exercise, Transfers


Treatment Duration:  Jan 12, 2019


Frequency:  At least 5 of 7 days/Wk (IRF)


Estimated Hrs Per Day:  1.5 hours per day





OT IPOC


Problems:  Decreased Activ Tolerance, Decreased Safety Aware, Decreased UE 

Strength, Dependent Transfers, Impaired Cognition, Impaired Self-Care Skills


OT Treatment, Training and Edu:  Yes


OT Problems


Pt would benefit from skilled OT to increase his independence in basic self 

care.


Plan of Care:  ADL Retraining, Functional Mobility, Group Exercise/Act as Ind (

education, exercise, socialization, funct mobility, memory), UE Funct Exercise/

Act, UE Neuromus Re-Ed/Coord


Treatment Duration:  Jan 12, 2019


Frequency:  At least 5 of 7 days/Wk (IRF)


Estimated Hrs Per Day:  1.5 hours per day (1.25 to 1.5)





[facility]


Speech Therapy Treatment Plan:  Continue Plan of Care


Treatment Duration:  Dec 28, 2018


Frequency:  5 times per week


Estimated Hrs Per Day:  .5 hour per day





/Case Mgmt


/Case Managemen:  Discharge Planning, Patient/Family Counseling





Dietitian/Nutritionist


Dietitian/Nutritionist to monitor nutritional status and make changes and/or 

recommendations as needed and work with speech pathology on dietary upgrades as 

the occur.





Neuropsychology/Psychology





[facility]





Physician IPOC


Medical Issues being managed closely and that require the 24 hour availability 

of a physician:


Hx of multiple falls


Dementia


IGC code 16


Etiologic DX RT Quadriceps tendon tear


Medical Issues:  DVT Prophylaxis, Falls Precautions, Infection Protection, Pain 

Management, Weight Bearing Precautions, Wound Care, Other (List) (as per above)


Brief Synthesis of Preadmission Screen, Post-Admission Evaluation, and Therapy 


Evaluations:77 yo male who had been Independent and living with spouse who has 

a hx of falls and cognitive deficits with a resulting RT Quadriceps tendon tear 

s/p repair with ortho Referred to IRU for ongoing care and Therapies prior to 

discharge to home with spouse. Patient currently WBAT RT LE with knee brace


Medical Prognosis:  Good


Anticipated Length of Stay:  01-12-19


Modified Independent to supervision for adls and mobility skills


Anticipated d/c Destination:  Home with spouse and [facility]











JED BEAR MD Dec 24, 2018 19:19

## 2018-12-24 NOTE — OCCUPATIONAL THER DAILY NOTE
OT Current Status-Daily Note


Subjective


Pt alert, lying in bed.  Pt agrees to therapy.  No c/o pain at this time.





Mental Status/Objective


Therapy Code Descriptions/Definitions 





Functional [place] Measure:


0=Not Assessed/NA        4=Minimal Assistance


1=Total Assistance        5=Supervision or Setup


2=Maximal Assistance  6=Modified [place]


3=Moderate Assistance 7=Complete [place]





ADL-Treatment


Pt able to go from supine to EOB using bedrail, mod I.   Pt able to button/zip 

pants by self.


Therapy Code Descriptions/Definitions 





Functional [place] Measure:


0=Not Assessed/NA        4=Minimal Assistance


1=Total Assistance        5=Supervision or Setup


2=Maximal Assistance  6=Modified [place]


3=Moderate Assistance 7=Complete [place]








Therapy Quality Codes:


6    Independent with activity with or without an assistive device


5    Patient requires set up or clean up by helper.  Patient completes activity

  by  themselves


4    Supervision or touching assist (CGA). [place] provide cues , steadying 

assist


3    The helper provides less than half the effort to complete the activity


2    The helper provides more than half the effort to complete the activity


1    Dependent.  The helper does all the effort to complete an activity 


7    Patient refused to complete or attempt activity


9    The patient did not perform the activity before the current illness or 

injury


88  Not attempted due to Medical conditions or safety concerns





Other Treatment


Pt ambulated to therapy gym using FWW, CGA.  Completed arm bike set for 15 min 

at 15 ivory resistance with multiple breaks to increase strength and activity 

tolerance for daily functional tasks.  Pt ambulated back to room to sit in high 

surface chair.  LE elevated, call light/phone in reach.  Telesitter in room. 

All needs met in room.





OT Short Term Goals


Short Term Goals


Time Frame:  Dec 28, 2018


Bathing(FIM):  4


Toileting(FIM):  3


Toilet/Commode Transfer(FIM):  3


Additional Short Term Goals:  1-Demonstrate ADL Tasks, 2-Verbalize Understanding

, 3-ImproveStrength/Deepti


1=Demonstrate adherence to instructed precautions during ADL tasks.


2=Patient will verbalize/demonstrate understanding of assistive devices/

modifications for ADL.


3=Patient will improve strength/tolerance for activity to enable patient to 

perform ADL's.





OT Long Term Goals


Long Term Goals


Time Frame:  2019


Eating (FIM):  6


Eating (QC):  6


Groomin


Oral Hygiene (QC):  6


Bathing(FIM):  5


Shower/Bathe Self (QC):  5


Upper Body Dressing(FIM):  6


Upper Body Dressing (QC):  6


Lower Body Dressing(FIM):  5


Lower Body Dressing (QC):  5


On/Off Footwear (QC):  5


Toileting(FIM):  5


Toileting Hygiene (QC):  5


Toilet/Commode Transfer(FIM):  5


Toilet/Commode Transfer (QC):  5


Shower Transfer(FIM):  5


Additional Goals:  1-Demonstrate ADL Tasks, 2-Verbalize Understanding, 3-

ImproveStrength/Deepti


1=Demonstrate adherence to instructed precautions during ADL tasks.


2=Patient will verbalize/demonstrate understanding of assistive devices/

modifications for ADL.


3=Patient will improve strength/tolerance for activity to enable patient to 

perform ADL's.





OT Education/Plan


Problem List/Assessment


Pt would benefit from skilled OT to increase his independence in basic self 

care.





Discharge Recommendations


Plan/Recommendations:  Continue POC





Treatment Plan/Plan of Care


Patient would benefit from OT for education, treatment and training to promote 

independence in ADL's, mobility, safety and/or upper extremity function for ADL'

s.


Plan of Care:  ADL Retraining, Functional Mobility, Group Exercise/Act as Ind (

education, exercise, socialization, funct mobility, memory), UE Funct Exercise/

Act, UE Neuromus Re-Ed/Coord


Treatment Duration:  2019


Frequency:  At least 5 of 7 days/Wk (IRF)


Estimated Hrs Per Day:  1.5 hours per day (1.25 to 1.5)


Agreement:  Yes


Rehab Potential:  Fair





Time/GCodes


Start Time:  09:45


Stop Time:  10:30


Total Time Billed (hr/min):  30


Billed Treatment Time


1 visit-FA 1 (20 min)  EX 2 (25 min)











TAPAN MESA Dec 24, 2018 10:29

## 2018-12-24 NOTE — NUR
MSW met with patient to complete initial assessment. Patient was alert and agreeable to 
assessment. Patient resides in a single level home with his spouse.The home has two steps at 
the entrance with two poles that could be utilized for assistance, if needed. Patient and 
spouse reside in in [place], [place]. Patient reports approx 8 falls within the last month. 
Patient admitted to ARU from internally with from a fall that resulted in a R Patella tendon 
tear (repair completed by Ronit, per note on 12/23, patient is upgraded to WBAT with 
brace). Prior to this fall, another occurred in Oct, since that time, patient has utilized 
crutches for ambulation. He reports independence with ambulation prior to Oct. Behavioral 
Health Consult was placed to assess patients cognitive status and to determine if he is a 
risk to himself or others due to SI comments. Following the consult, Prachi does not 
believe he is a danger to himself, at this time, and has recommended a new anitdipresseant. 
This could be beneficial if diagnosis is only relational to depression; however, the memory 
loss and confusion has been more prevalent, per patients wife. Patient possesses Medicare 
and [employer] plan with preference of [facility] for local pharmacy needs. Patient identifies 
spouse, Nicole ([phone]) as primary contact. Prior to falls, patient reports problems 
with constipation. He saw PCP to obtain medication for relief. Patient has seen PCP, Dr. Mccray for years, but wishes to seek out new PCP as Dr. Mccray prescribed him the medication 
that created bowel urgency, which patient believes urgency caused falls. MSW will speak with 
patient regarding this request at a later date to ensure he wishes to proceed with 
alternative physician. MSW reviewed typical rehab length of stay and Weekly Team 
Conferences. MSW will continue to follow for additional needs.

## 2018-12-24 NOTE — PM&R POST ADMISSION ASSESSMENT
Post Admission Physician Asses


Date seen by provider:  Dec 24, 2018


Time seen by provider:  19:00


The preadmission screen agrees with the post admission assessment that the 

patient is a good candidate for inpatient rehabilitation.





The patient will have a comprehensive program of inpatient rehabilitation with 

a goal of maximizing level of functional independence prior to discharge home 

with spouse.   The patient will have PT/OT ninety minutes per day, each 

discipline, five days a week for 2 weeks for gait, strengthening, conditioning, 

balance, ADLs, any patient/family/caregiver training as necessary.  Speech 

therapy to do cognitive assessment and treat as indicated for 3 to 5 days a 

week for 2 weeks 30 to 45 min per session. Rehabilitation nursing to assist 

with bowel, bladder, skin, wound care, medication administration, pain 

management.   to assist with discharge planning, community 

reentry. SCD's for DVT prophylaxis.





He appears to be well motivated to participate in three hours of therapy a day.

  He should be able to tolerate three hours of therapy a day from a medical and 

surgical standpoint.  He should benefit from the three hours of therapy a day.  

He has a reasonable discharge plan, reasonable discharge rehabilitation goals 

and a supportive family. He has various comorbidities that need to be closely 

monitored with medications and treatments adjusted on a daily basis as needed. 


These include:


Hx of recurrent falls


Cognitive deficits





Psychiatric code 16


Etiologic DX RT Quadriceps tendon tear





Barriers to discharge for this patient who had been independent prior to this 

are for him to be modified independent to supervision for ADLs and mobility 

skills prior to discharge home with spouse, so as to lessen the burden of the 

caregivers. 





Risks for this patient include:


 1.  Fall


 2.  Fracture


 3.  DVT


 4.  Pulmonary embolism


 5.  Wound infection


 6.  Skin breakdown


 7.  Contractures


 8.  Poorly controlled pain


 9.  Urinary retention


10.  UTI


11.  Respiratory infection


12.  Aspiration


13.  Worsening confusion





Estimated Length of Stay:  14 days





Prognosis:  Rehab prognosis appears good for goal of discharge home with spouse 

modified independent to supervision for ADLs and mobility skills.


Date Identified:  Dec 24, 2018


Time Identified:  19:00


Action Plan to Resolve CSMI:  


Current meds reviewed


General:  Alert, Cooperative, No Acute Distress


HEENT:  Atraumatic, PERRLA, EOMI, Mucous Memb Moist/Pink


Neck:  Supple, No JVD


Lungs:  Clear to Auscultation


Heart:  Regular Rate


Abdomen:  Normal Bowel Sounds, Soft, No Tenderness


Extremities:  Other (rt knee brace)


Neuro:  Other (strength impaired s/p rt Quad repair)











JED BEAR MD Dec 24, 2018 19:13

## 2018-12-24 NOTE — NUR
Pastoral Care Visit, pt was in commons area with other pts preparing to eat. I had 
collective prayer with group.

## 2018-12-24 NOTE — PROGRESS NOTE-HOSPITALIST
Subjective


HPI/CC On Admission


Date Seen by Provider:  Dec 24, 2018


Time Seen by Provider:  09:45


Subjective/Events-last exam


Patient doing well


Working with therapy


Once to go home


Lovenox for DVT prophylaxis tolerated well


Bowels are moving


Pain is controlled





Review of Systems


Musculoskeletal:  leg pain





Objective


Exam


Vital Signs





Vital Signs








  Date Time  Temp Pulse Resp B/P (MAP) Pulse Ox O2 Delivery O2 Flow Rate FiO2


 


18 09:00     95 Room Air  


 


18 05:22 99.4 82 20 156/80 (105)    





Capillary Refill : Less Than 3 Seconds


General Appearance:  No Apparent Distress, WD/WN, Chronically ill


Respiratory:  Chest Non Tender, Lungs Clear, Normal Breath Sounds, No Accessory 

Muscle Use, No Respiratory Distress


Cardiovascular:  Regular Rate, Rhythm, No Edema, No Gallop, No JVD, No Murmur, 

Normal Peripheral Pulses


Neurologic/Psychiatric:  Alert, Oriented x3, No Motor/Sensory Deficits, Normal 

Mood/Affect





Results/Procedures


Lab


Patient resulted labs reviewed.





Assessment/Plan


Assessment and Plan


Assess & Plan/Chief Complaint


Assessment:


Right quadriceps tendon disruption with medial and lateral meniscus tears 

prohibiting ambulation


COPD


Cardiac murmur


Depression


Suicidal statements now he denies he stated it





Plan:


Inpatient rehabilitation protocol


Ambulate as much as possible


Appreciate orthopedics





Diagnosis/Problems


Diagnosis/Problems





(1) Unspecified injury of right quadriceps muscle, fascia and tendon, initial 

encounter


Status:  Acute


(2) COPD (chronic obstructive pulmonary disease)


Status:  Chronic


Qualifiers:  


   COPD type:  unspecified COPD  Qualified Codes:  J44.9 - Chronic obstructive 

pulmonary disease, unspecified


(3) Cardiac murmur


Status:  Chronic





Clinical Quality Measures


DVT/VTE Risk/Contraindication:


Risk Factor Score Per Nursin


RFS Level Per Nursing on Admit:  4+=Very High











REED EGAN DO Dec 24, 2018 11:19

## 2018-12-24 NOTE — THERAPY GROUP DAILY NOTE
Therapy Daily Group Note


Patient Education Topic


Exercises, Other List Below





Exercises


LE Seated Exercise, Stretching, UE Exercise





Other/Notes


Memory: Jessica paniagua


Pt participated in group led ther ex and was able to demosntrate and lead one 

particular exercise which required him to read and demonstrate an exercise.  Pt 

was participatory with the exercise and interacted well with others.  Pt 

benefit from the socialization to promote general well being and happiness 

while hospitalized.  Pt walked to and from group therapy with assist of staff.


Start Time:  11:30


Stop Time:  12:45


Total Billed Treatment Time:  75


Total Billed Treatment


1, GRP











LUPILLO MORALES ST Dec 24, 2018 12:58


TAPAN GA PT Dec 27, 2018 14:35

## 2018-12-24 NOTE — PHYSICAL THERAPY DAILY NOTE
PT Daily Note-Current


Subjective


Pt reports doing ok today, agreeable to PT. States feeling some nausea and pain 

in right knee.





Pain





   Location:  Right


   Location Body Site:  Knee


   Comment:  2/10 at rest, highest 6/10 during gait and movement





Appearance


upon arrival, Pt supine in bed with head elevated, agreeable to Therapy


At end of session, pt supine in bed with alarm activated, head elevated, awake,

alert, watching TV and ordering breakfast. All needs met at this time





Mental Status


Patient Orientation:  Person, Place, Time, Eyes Open, Situation


Attachments:  Knee Immobilizer





Transfers


Therapy Code Descriptions/Definitions 





Functional [place] Measure:


0=Not Assessed/NA        4=Minimal Assistance


1=Total Assistance        5=Supervision or Setup


2=Maximal Assistance  6=Modified [place]


3=Moderate Assistance 7=Complete [place]








Therapy Quality Codes:


6    Independent with activity with or without an assistive device


5    Patient requires set up or clean up by helper.  Patient completes activity

  by  themselves


4    Supervision or touching assist (CGA). [place] provide cues , steadying 

assist


3    The helper provides less than half the effort to complete the activity


2    The helper provides more than half the effort to complete the activity


1    Dependent.  The helper does all the effort to complete an activity 


7    Patient refused to complete or attempt activity


9    The patient did not perform the activity before the current illness or 

injury


88  Not attempted due to Medical conditions or safety concerns


Transfers (B, C, W/C) (FIM):  4


Scootin


Rollin


Roll Left to Right (QC):  5


Supine to/from Sit:  4 (assist with RLE)


Sit to/from Stand:  5 (bed and chair height elevated)


Sit to Lying (QC):  5


Sit to Stand (QC):  5


pt requiring chair and bed heights elevated in order to stand without physical 

assist





Weight Bearing


Right Lower Extremity:  Right


Full Weight Bearing


Left Lower Extremity:  Left


Full Weight Bearing





Gait Training


Does the Patient Walk?:  Yes


Gait (FIM):  5


Distance (FIM):  3=150 ft


Distance:  350x2


Walk 10 feet (QC):  5


Walk 50 ft with 2 Turns(QC):  5


Walk 150 ft (QC):  5


Gait Level of Assist:  5


Gait Persons Needed:  1


Gait Assistive Device:  FWW


Instruction for correct and safe positioning within walker and keeping walker 

with him at all times during transitions. Step through gait pattern but with 

decreased step length and height. Right leg brace





Exercises


Supine Ex:  Ankle pumps, Straight leg raise (AAROM, unable to lift Independently

)


Supine Reps:  20


Seated Therapy Exercises:  Ankle pumps, Sit to stand


Seated Reps:  20





Treatments


gait and transfer training, LE exercises





Assessment


Current Status:  Good Progress





PT Long Term Goals


Long Term Goals


PT Long Term Goals Time Frame:  2019


Transfers (B,C,W/C) (FIM):  5


Sit to Lying (QC):  5


Lying-Sitting on Side/Bed(QC):  5


Sit to Stand (QC):  5


Rollin


Roll Left to Right (QC):  5


Chair/Bed-to-Chair Xfer(QC):  5


Car Transfer (QC):  5


Does the Patient Walk:  Yes


Gait (FIM):  5


Gait distance (FIM):  3=150 ft


Distance:  150'


Walk 10 feet (QC):  5


Walk 10ft-Uneven Surface(QC):  5


Walk 50ft with 2 Turns (QC):  5


Walk 150 ft (QC):  5


Gait Level of Assist:  5


Gait Assistive Device:  FWW


Stairs (FIM):  5


# of Steps:  6


1 Step (curb) (QC):  5


4 Steps (QC):  5


12 Steps (QC):  88


Stairs Level Of Assist:  5


Picking up an Object (QC):  5





PT Plan


Problem List


Problem List:  Activity Tolerance, Functional Strength, Safety, Balance, Gait, 

Transfer, Bed Mobility, ROM





Treatment/Plan


Treatment Plan:  Continue Plan of Care


Treatment Plan:  Bed Mobility, Education, Functional Activity Deepti, Functional 

Strength, Group Therapy, Gait, Safety, Therapeutic Exercise, Transfers


Treatment Duration:  2019


Frequency:  At least 5 of 7 days/Wk (IRF)


Estimated Hrs Per Day:  1.5 hours per day


Patient and/or Family Agrees t:  Yes





Safety Risks/Education


Patient Education:  Gait Training, Transfer Techniques, Safety Issues


Teaching Recipient:  Patient


Teaching Methods:  Demonstration, Discussion


Response to Teaching:  Verbalize Understanding, Return Demonstration, 

Reinforcement Needed





Time/GCodes


Time In:  800


Time Out:  900


Total Billed Treatment Time:  60


Total Billed Treatment


1 visit


GT x15min


EX x15min


GA x30min











SHOBHA CHANG PTA Dec 24, 2018 08:56

## 2018-12-25 VITALS — DIASTOLIC BLOOD PRESSURE: 67 MMHG | SYSTOLIC BLOOD PRESSURE: 111 MMHG

## 2018-12-25 VITALS — DIASTOLIC BLOOD PRESSURE: 74 MMHG | SYSTOLIC BLOOD PRESSURE: 138 MMHG

## 2018-12-25 RX ADMIN — OXYCODONE HYDROCHLORIDE AND ACETAMINOPHEN PRN TAB: 5; 325 TABLET ORAL at 08:13

## 2018-12-25 RX ADMIN — IBUPROFEN PRN MG: 600 TABLET ORAL at 16:37

## 2018-12-25 RX ADMIN — ENOXAPARIN SODIUM SCH MG: 100 INJECTION SUBCUTANEOUS at 15:04

## 2018-12-25 RX ADMIN — FLUTICASONE PROPIONATE AND SALMETEROL XINAFOATE SCH PUFF: 115; 21 AEROSOL, METERED RESPIRATORY (INHALATION) at 07:11

## 2018-12-25 RX ADMIN — IBUPROFEN PRN MG: 600 TABLET ORAL at 08:13

## 2018-12-25 NOTE — PROGRESS NOTE-HOSPITALIST
Subjective


HPI/CC On Admission


Date Seen by Provider:  Dec 25, 2018


Time Seen by Provider:  11:00


Subjective/Events-last exam


patient doing well overall


Wants to go home


Leg pain is relieved with pain medication


Bowels are moving





Review of Systems


Musculoskeletal:  leg pain





Objective


Exam


Vital Signs





Vital Signs








  Date Time  Temp Pulse Resp B/P (MAP) Pulse Ox O2 Delivery O2 Flow Rate FiO2


 


18 09:00     95 Room Air  


 


18 06:44 99.0 74 16 138/74 (95)    





Capillary Refill : Less Than 3 Seconds


General Appearance:  No Apparent Distress, WD/WN, Chronically ill


Respiratory:  Chest Non Tender, Lungs Clear, Normal Breath Sounds, No Accessory 

Muscle Use, No Respiratory Distress


Cardiovascular:  Regular Rate, Rhythm, No Edema, No Gallop, No JVD, No Murmur, 

Normal Peripheral Pulses


Neurologic/Psychiatric:  Alert, Oriented x3, No Motor/Sensory Deficits, Normal 

Mood/Affect





Results/Procedures


Lab


Patient resulted labs reviewed.





Assessment/Plan


Assessment and Plan


Assess & Plan/Chief Complaint


Assessment:


Right quadriceps tendon disruption with medial and lateral meniscus tears 

prohibiting ambulation


COPD


Cardiac murmur


Depression


Suicidal statements now he denies he stated it





Plan:


Inpatient rehabilitation protocol


Ambulate as much as possible


Appreciate orthopedics





Diagnosis/Problems


Diagnosis/Problems





(1) Unspecified injury of right quadriceps muscle, fascia and tendon, initial 

encounter


Status:  Acute


(2) COPD (chronic obstructive pulmonary disease)


Status:  Chronic


Qualifiers:  


   COPD type:  unspecified COPD  Qualified Codes:  J44.9 - Chronic obstructive 

pulmonary disease, unspecified


(3) Cardiac murmur


Status:  Chronic





Clinical Quality Measures


DVT/VTE Risk/Contraindication:


Risk Factor Score Per Nursin


RFS Level Per Nursing on Admit:  4+=Very High











REED EGAN DO Dec 25, 2018 12:25

## 2018-12-26 VITALS — SYSTOLIC BLOOD PRESSURE: 127 MMHG | DIASTOLIC BLOOD PRESSURE: 78 MMHG

## 2018-12-26 VITALS — DIASTOLIC BLOOD PRESSURE: 72 MMHG | SYSTOLIC BLOOD PRESSURE: 126 MMHG

## 2018-12-26 RX ADMIN — FLUTICASONE PROPIONATE AND SALMETEROL XINAFOATE SCH PUFF: 115; 21 AEROSOL, METERED RESPIRATORY (INHALATION) at 06:27

## 2018-12-26 RX ADMIN — ENOXAPARIN SODIUM SCH MG: 100 INJECTION SUBCUTANEOUS at 14:52

## 2018-12-26 RX ADMIN — OXYCODONE HYDROCHLORIDE AND ACETAMINOPHEN PRN TAB: 5; 325 TABLET ORAL at 20:32

## 2018-12-26 NOTE — THERAPY GROUP DAILY NOTE
Therapy Daily Group Note


Patient Education Topic


Exercises, Other List Below (everyday home items for resistance and strength 

training)





Exercises


LE Seated Exercise, UE Exercise





Other/Notes


pt amb to group wtih FWW to and from group, toileted before arriving at group. 

PT/OT group consisted of introduction of name number of siblingg and favorite 

[employer] gift received.


Start Time:  13:00


Stop Time:  14:00


Total Billed Treatment Time:  60


Total Billed Treatment


1 visit


group 60 minute











SHOBHA CHANG PTA Dec 26, 2018 14:30

## 2018-12-26 NOTE — PHYSICAL THERAPY DAILY NOTE
PT Daily Note-Current


Subjective


Pt c/o lightheadedness from the time of sitting up out of bed to end of tx 

session, occasional dizziness with almost nauseated feeling. c/o brief left ear 

feeling and sounding of horn honking. Nsg notified.





Pain





   Numeric Pain Scale:  1


   Location:  Right


   Comment:  leg





Appearance


pt supine in bed sleeping, easily aroused and agreeable to PT, but constant c/o 

lightheadedness throughout treatment session after sitting up out of bed


At end of session, call light and phone within reach, bedside table in front of 

pt, pt sitting up in chair with continued c/o lightheadedness and with c/o of 

almost feeling nauseus, requesting cold compress for head, nurse notified.





Mental Status


Patient Orientation:  Person, Place, Time, Eyes Open, Situation


Attachments:  Knee Immobilizer





Transfers


Therapy Code Descriptions/Definitions 





Functional [place] Measure:


0=Not Assessed/NA        4=Minimal Assistance


1=Total Assistance        5=Supervision or Setup


2=Maximal Assistance  6=Modified [place]


3=Moderate Assistance 7=Complete [place]








Therapy Quality Codes:


6    Independent with activity with or without an assistive device


5    Patient requires set up or clean up by helper.  Patient completes activity

  by  themselves


4    Supervision or touching assist (CGA). [place] provide cues , steadying 

assist


3    The helper provides less than half the effort to complete the activity


2    The helper provides more than half the effort to complete the activity


1    Dependent.  The helper does all the effort to complete an activity 


7    Patient refused to complete or attempt activity


9    The patient did not perform the activity before the current illness or 

injury


88  Not attempted due to Medical conditions or safety concerns


Transfers (B, C, W/C) (FIM):  4


Scootin


Rollin


Roll Left to Right (QC):  5


Supine to/from Sit:  5


Sit to/from Stand:  4


Sit to Stand (QC):  4


c/o lightheadedness with all transitions. Pt unable to stand from standard 

height bed or chair without physical assist but is able from elevated bed and 

chair height with cga to sba





Weight Bearing


Right Lower Extremity:  Right


Full Weight Bearing


Left Lower Extremity:  Left


Full Weight Bearing





Gait Training


Does the Patient Walk?:  Yes


Gait (FIM):  1


Distance (FIM):  1=up to 49 ft


Distance:  10x2


Gait Level of Assist:  5


Gait Persons Needed:  1


Gait Assistive Device:  FWW


unable to  safely perform gait much today due to c/o lighthaededness and 

dizziness





Exercises


Seated Therapy Exercises:  Ankle pumps, Sit to stand, Long arc quads, Chair 

press-ups, Hip flexion, Hip abd/add


Seated Reps:  20


Standing:  Sit to Stand


Standing Reps:  5


pt performed seated cervical/vertigo exercise x5 each 3 sets throughout tx 

session to assist in alleviated lightheadedness. Pt Pt reports he has practiced 

these exercises before when he had vertigo and did help.





Treatments


transfer and gait training, exercises





Assessment


pt able to perform full PT session but unable to perform much gait due to 

increased lightheadedness and occasional dizziness leading to nausea feeling





PT Long Term Goals


Long Term Goals


PT Long Term Goals Time Frame:  2019


Transfers (B,C,W/C) (FIM):  5


Sit to Lying (QC):  5


Lying-Sitting on Side/Bed(QC):  5


Sit to Stand (QC):  5


Rollin


Roll Left to Right (QC):  5


Chair/Bed-to-Chair Xfer(QC):  5


Car Transfer (QC):  5


Does the Patient Walk:  Yes


Gait (FIM):  5


Gait distance (FIM):  3=150 ft


Distance:  150'


Walk 10 feet (QC):  5


Walk 10ft-Uneven Surface(QC):  5


Walk 50ft with 2 Turns (QC):  5


Walk 150 ft (QC):  5


Gait Level of Assist:  5


Gait Assistive Device:  FWW


Stairs (FIM):  5


# of Steps:  6


1 Step (curb) (QC):  5


4 Steps (QC):  5


12 Steps (QC):  88


Stairs Level Of Assist:  5


Picking up an Object (QC):  5





PT Plan


Problem List


Problem List:  Activity Tolerance, Functional Strength, Safety, Balance, Gait, 

Transfer, Bed Mobility, ROM





Treatment/Plan


Treatment Plan:  Continue Plan of Care


Treatment Plan:  Bed Mobility, Education, Functional Activity Deepti, Functional 

Strength, Group Therapy, Gait, Safety, Therapeutic Exercise, Transfers


Treatment Duration:  2019


Frequency:  At least 5 of 7 days/Wk (IRF)


Estimated Hrs Per Day:  1.5 hours per day


Patient and/or Family Agrees t:  Yes





Safety Risks/Education


Patient Education:  Gait Training, Transfer Techniques, Safety Issues


Teaching Recipient:  Patient


Teaching Methods:  Demonstration, Discussion


Response to Teaching:  Verbalize Understanding, Return Demonstration





Time/GCodes


Time In:  800


Time Out:  900


Total Billed Treatment Time:  60


Total Billed Treatment


1 visit


FA x30 min


EX x30 min











SHOBHA CHANG PTA Dec 26, 2018 08:59

## 2018-12-26 NOTE — PROGRESS NOTE (SOAP)
Subjective


Time Seen by a Provider:  08:23


Subjective/Events-last exam


Patient depressed.


Patient states he won't end his life.


Patient doesn't care about living





Objective


Exam





Vital Signs








  Date Time  Temp Pulse Resp B/P (MAP) Pulse Ox O2 Delivery O2 Flow Rate FiO2


 


18 06:27     94 Room Air  


 


18 06:00 98.6 66 18 126/72 (90) 98 Room Air  


 


18 21:00      Room Air  


 


18 19:26      Room Air  


 


18 16:32 98.6 70 16 111/67 (82) 96 Room Air  


 


18 13:46      Room Air  


 


18 09:00     95 Room Air  














I & O 


 


 18





 07:00


 


Intake Total 1122 ml


 


Output Total 1000 ml


 


Balance 122 ml





Capillary Refill : Less Than 3 Seconds


General Appearance:  No Apparent Distress


HEENT:  Normal ENT Inspection


Neck:  Full Range of Motion


Respiratory:  No Accessory Muscle Use, No Respiratory Distress


Cardiovascular:  Regular Rate, Rhythm, No Murmur





Assessment/Plan


Assessment/Plan


Assess & Plan/Chief Complaint


Right quadrant tendon disruption with medial and lateral meniscal tear.


COPD.


Cardiac murmur.


Depression.


Patient states he will not hurt himself





Clinical Quality Measures


Admission Status


Admission Dx


Right quadriceps tendon disruption with medial and lateral miniscus tears.


COPD





DVT/VTE Risk/Contraindication:


Risk Factor Score Per Nursin


RFS Level Per Nursing on Admit:  4+=Very High











JERONIMO OLIVARES DO Dec 26, 2018 08:26

## 2018-12-26 NOTE — OCCUPATIONAL THER DAILY NOTE
OT Current Status-Daily Note


Subjective


Pt seen in room, up in chair, head in hands, cold pack on forehead. Pt reported 

that he was light-headed and dizzy. Pain not mentioned





Appearance


Alert, painful look on face





Mental Status/Objective


Therapy Code Descriptions/Definitions 





Functional [place] Measure:


0=Not Assessed/NA        4=Minimal Assistance


1=Total Assistance        5=Supervision or Setup


2=Maximal Assistance  6=Modified [place]


3=Moderate Assistance 7=Complete [place]





ADL-Treatment


Pt provided with cold washcloth for forehead. Nursing in to check vitals which 

were OK. Nursing helped transfer pt back to bed, with help needed for both legs 

and trunk due to lightheadedness. Pt reclined in bed and started to feel a 

little better. Able to put denture paste on dentures and put them in and clean 

mouth. Able to open packages, cut up food, feed self, get a drink. All ADLs 

took longer than usual and skilled cues needed for cleaning/inserting teeth. Pt 

cont to feel some lightheadedness throughout tx until end of tx when he was 

able to sit up in bed to feed himself. Pt left up in bed, 4 rails up, bed alarm 

on, all needs met.


Therapy Code Descriptions/Definitions 





Functional [place] Measure:


0=Not Assessed/NA        4=Minimal Assistance


1=Total Assistance        5=Supervision or Setup


2=Maximal Assistance  6=Modified [place]


3=Moderate Assistance 7=Complete [place]








Therapy Quality Codes:


6    Independent with activity with or without an assistive device


5    Patient requires set up or clean up by helper.  Patient completes activity

  by  themselves


4    Supervision or touching assist (CGA). [place] provide cues , steadying 

assist


3    The helper provides less than half the effort to complete the activity


2    The helper provides more than half the effort to complete the activity


1    Dependent.  The helper does all the effort to complete an activity 


7    Patient refused to complete or attempt activity


9    The patient did not perform the activity before the current illness or 

injury


88  Not attempted due to Medical conditions or safety concerns


Eating (FIM):  6


Grooming (FIM):  5 (supervision)





Education


OT Patient Education:  Progress toward Goal/Update tx plan, Purpose of tx/

functional activities


Teaching Recipient:  Patient


Teaching Methods:  Discussion


Response to Teaching:  Verbalize Understanding, Reinforcement Needed





OT Short Term Goals


Short Term Goals


Time Frame:  Dec 28, 2018


Bathing(FIM):  4


Toileting(FIM):  3


Toilet/Commode Transfer(FIM):  3


Additional Short Term Goals:  1-Demonstrate ADL Tasks, 2-Verbalize Understanding

, 3-ImproveStrength/Deepti


1=Demonstrate adherence to instructed precautions during ADL tasks.


2=Patient will verbalize/demonstrate understanding of assistive devices/

modifications for ADL.


3=Patient will improve strength/tolerance for activity to enable patient to 

perform ADL's.





OT Long Term Goals


Long Term Goals


Time Frame:  2019


Eating (FIM):  6


Eating (QC):  6


Groomin


Oral Hygiene (QC):  6


Bathing(FIM):  5


Shower/Bathe Self (QC):  5


Upper Body Dressing(FIM):  6


Upper Body Dressing (QC):  6


Lower Body Dressing(FIM):  5


Lower Body Dressing (QC):  5


On/Off Footwear (QC):  5


Toileting(FIM):  5


Toileting Hygiene (QC):  5


Toilet/Commode Transfer(FIM):  5


Toilet/Commode Transfer (QC):  5


Shower Transfer(FIM):  5


Additional Goals:  1-Demonstrate ADL Tasks, 2-Verbalize Understanding, 3-

ImproveStrength/Deepti


1=Demonstrate adherence to instructed precautions during ADL tasks.


2=Patient will verbalize/demonstrate understanding of assistive devices/

modifications for ADL.


3=Patient will improve strength/tolerance for activity to enable patient to 

perform ADL's.





OT Education/Plan


Problem List/Assessment


Pt would benefit from skilled OT to increase his independence in basic self 

care.





Discharge Recommendations


Plan/Recommendations:  Continue POC





Treatment Plan/Plan of Care


Patient would benefit from OT for education, treatment and training to promote 

independence in ADL's, mobility, safety and/or upper extremity function for ADL'

s.


Plan of Care:  ADL Retraining, Functional Mobility, Group Exercise/Act as Ind (

education, exercise, socialization, funct mobility, memory), UE Funct Exercise/

Act, UE Neuromus Re-Ed/Coord


Treatment Duration:  2019


Frequency:  At least 5 of 7 days/Wk (IRF)


Estimated Hrs Per Day:  1.5 hours per day (1.25 to 1.5)


Agreement:  Yes


Rehab Potential:  Fair





Time/GCodes


Start Time:  09:00


Stop Time:  09:45


Total Time Billed (hr/min):  45


Billed Treatment Time


visit, 45 minutes ADL











KLUSENER,PARUL OT Dec 26, 2018 09:47

## 2018-12-26 NOTE — SPEECH THERAPY DAILY NOTE
Speech Daily Progress Note


Subjective


Date Seen by Provider:  Dec 26, 2018


Time Seen by Provider:  00:30


Patient was resting in bed. He was alert and completed tasks.





Assessment


Assessment Current Status:  Good Progress





Treatment Plan


Continue Plan of Care





Communication


Comprehension:  2


Expression:  3





Social Cognition


Social Interaction:  3


Problem Solvin


Memory:  2





Speech Short Term Goals


Short Term Goals


Short Term Goals


1) Patient will recall information/procedures related to personal safety with 80

% or greater accuracy.


2) Patient will demo follow through with 1-2 step directions with 80% or 

greater accuracy.


3) Patient will complete memory tasks with 80% or greater accuracy.





Speech Long Term Goals


Long Term Goals


Patient will improve safety awareness and independence for personal needs in 

order to return home safely.





Speech-Plan


Patient/Family Goals


Patient/Family Goals:  


Patient plans to return home with his wife post rehab.





Treatment Plan


Speech Therapy Treatment Plan:  Continue Plan of Care


Patient is progressing with skilled ST services.


Treatment Duration:  Dec 28, 2018


Frequency:  5 times per week


Estimated Hrs Per Day:  .5 hour per day


Rehab Potential:  Fair


Barriers to Learning:  


Patient has memory and word finding difficulties.


Pt/Family Agrees to Plan:  Yes





Safety Risks/Education


Teaching Recipient:  Patient


Teaching Methods:  Discussion


Response to Teaching:  Verbalize Understanding


Education Topics Provided:  


Safety and procedures.





Time


Speech Therapy Time In:  11:00


Speech Therapy Time Out:  11:30


Total Billed Time:  30


Billed Treatment Time


1GAIL BETHANIA ST Dec 26, 2018 12:43

## 2018-12-26 NOTE — PROGRESS NOTE-STANDARD
Standard Progress Note


Progress Notes/Assess & Plan


Date Seen by a Provider:  Dec 26, 2018


Time Seen by a Provider:  09:20


Progress/Assessment & Plan


no current complaints


 RLE brace and dressing in place


NVI distally


 s/p R quad repair


ok to wbat in brace


Final Diagnosis


c/o vertigo





Vital Signs








  Date Time  Temp Pulse Resp B/P (MAP) Pulse Ox O2 Delivery O2 Flow Rate FiO2


 


12/26/18 06:27     94 Room Air  


 


12/26/18 06:00 98.6 66 18 126/72 (90) 98 Room Air  


 


12/25/18 21:00      Room Air  


 


12/25/18 19:26      Room Air  


 


12/25/18 16:32 98.6 70 16 111/67 (82) 96 Room Air  


 


12/25/18 13:46      Room Air  














I & O 


 


 12/26/18





 07:00


 


Intake Total 1122 ml


 


Output Total 1000 ml


 


Balance 122 ml





RLE--no calf tenderness. Neg Lyn's


dressing intact


s/p R quad repair 


continue PT/OT











SHILOH LOBO MD Dec 26, 2018 09:21

## 2018-12-27 VITALS — SYSTOLIC BLOOD PRESSURE: 137 MMHG | DIASTOLIC BLOOD PRESSURE: 77 MMHG

## 2018-12-27 VITALS — DIASTOLIC BLOOD PRESSURE: 66 MMHG | SYSTOLIC BLOOD PRESSURE: 106 MMHG

## 2018-12-27 RX ADMIN — FLUTICASONE PROPIONATE AND SALMETEROL XINAFOATE SCH PUFF: 115; 21 AEROSOL, METERED RESPIRATORY (INHALATION) at 09:27

## 2018-12-27 RX ADMIN — ENOXAPARIN SODIUM SCH MG: 100 INJECTION SUBCUTANEOUS at 15:10

## 2018-12-27 NOTE — OCCUPATIONAL THER DAILY NOTE
OT Current Status-Daily Note


Subjective


Pt seen in roomo, up in bed, agreeable to OT. No pain mentioned except when bed 

flattened which made his knee uncomfortable.





Appearance


Alert, cooperative





Mental Status/Objective


Therapy Code Descriptions/Definitions 





Functional [place] Measure:


0=Not Assessed/NA        4=Minimal Assistance


1=Total Assistance        5=Supervision or Setup


2=Maximal Assistance  6=Modified [place]


3=Moderate Assistance 7=Complete [place]





ADL-Treatment


Pt declined bathing or changing clothes. Pt was able to get up to sitting EOB 

himself and sit to stand CGA, FWW when bed raised up significantly. Cues to 

push up from bed, not pull up from walker. Walked CGA, FWW to bathroom. Able to 

manage clothing, sit on toilet with CGA, using both horizontal and vertical 

grab bars. CGA getting off toilet but much effort on patient's part to pull 

himself up to stand, due to R leg in immobilizer. Able to manage hygiene with 

SBA, FWW and pull pants back up. Walked CGA, FWW to sink to wash hands with SBA

, FWW. Pt walked back to room but needed to sit because he said he was 

lightheaded. Vitals checked and they were good. Pt walked CGA, FWW to gym and 

sat edge of tx table, elevated. He became lightheaded and needed to lie down, 

able to get both legs into bed. After a couple of minutes, he was able to sit 

up again EOB. Discussed toilet setup at home. He has a countertop and door 

frame that he uses to pull up from toilet in his bathroom. Toilet is "handicap" 

height but he would benefit from additional height. Showed pt examples of a 

couple different toilet risers, including one that bolts under toilet seat. Pt 

did not think that he would need to make his bed taller. Care transferred to PT.


Therapy Code Descriptions/Definitions 





Functional [place] Measure:


0=Not Assessed/NA        4=Minimal Assistance


1=Total Assistance        5=Supervision or Setup


2=Maximal Assistance  6=Modified [place]


3=Moderate Assistance 7=Complete [place]








Therapy Quality Codes:


6    Independent with activity with or without an assistive device


5    Patient requires set up or clean up by helper.  Patient completes activity

  by  themselves


4    Supervision or touching assist (CGA). [place] provide cues , steadying 

assist


3    The helper provides less than half the effort to complete the activity


2    The helper provides more than half the effort to complete the activity


1    Dependent.  The helper does all the effort to complete an activity 


7    Patient refused to complete or attempt activity


9    The patient did not perform the activity before the current illness or 

injury


88  Not attempted due to Medical conditions or safety concerns


Grooming (FIM):  5 (SBA to wash hands at sink, FWW)


Toileting (FIM):  4 (CGA when managing clothing and hygiene. Tall toilet, grab 

bars, FWW)


Toilet/Commode Transfer (FIM):  4 (Min assist to get off, CGA to get on toilet, 

using grab bars, FWW)





Education


OT Patient Education:  Modified ADL techniques, Purpose of tx/functional 

activities, Safety issues, Transfer techniques


Teaching Recipient:  Patient


Teaching Methods:  Discussion


Response to Teaching:  Verbalize Understanding, Return Demonstration, 

Reinforcement Needed





OT Short Term Goals


Short Term Goals


Time Frame:  Dec 28, 2018


Bathing(FIM):  4


Toileting(FIM):  3


Toilet/Commode Transfer(FIM):  3


Additional Short Term Goals:  1-Demonstrate ADL Tasks, 2-Verbalize Understanding

, 3-ImproveStrength/Deepti


1=Demonstrate adherence to instructed precautions during ADL tasks.


2=Patient will verbalize/demonstrate understanding of assistive devices/

modifications for ADL.


3=Patient will improve strength/tolerance for activity to enable patient to 

perform ADL's.





OT Long Term Goals


Long Term Goals


Time Frame:  2019


Eating (FIM):  6


Eating (QC):  6


Groomin


Oral Hygiene (QC):  6


Bathing(FIM):  5


Shower/Bathe Self (QC):  5


Upper Body Dressing(FIM):  6


Upper Body Dressing (QC):  6


Lower Body Dressing(FIM):  5


Lower Body Dressing (QC):  5


On/Off Footwear (QC):  5


Toileting(FIM):  5


Toileting Hygiene (QC):  5


Toilet/Commode Transfer(FIM):  5


Toilet/Commode Transfer (QC):  5


Shower Transfer(FIM):  5


Additional Goals:  1-Demonstrate ADL Tasks, 2-Verbalize Understanding, 3-

ImproveStrength/Deepti


1=Demonstrate adherence to instructed precautions during ADL tasks.


2=Patient will verbalize/demonstrate understanding of assistive devices/

modifications for ADL.


3=Patient will improve strength/tolerance for activity to enable patient to 

perform ADL's.





OT Education/Plan


Problem List/Assessment


Pt would benefit from skilled OT to increase his independence in basic self 

care.





Discharge Recommendations


Plan/Recommendations:  Continue POC





Treatment Plan/Plan of Care


Patient would benefit from OT for education, treatment and training to promote 

independence in ADL's, mobility, safety and/or upper extremity function for ADL'

s.


Plan of Care:  ADL Retraining, Functional Mobility, Group Exercise/Act as Ind (

education, exercise, socialization, funct mobility, memory), UE Funct Exercise/

Act, UE Neuromus Re-Ed/Coord


Treatment Duration:  2019


Frequency:  At least 5 of 7 days/Wk (IRF)


Estimated Hrs Per Day:  1.5 hours per day (1.25 to 1.5)


Agreement:  Yes


Rehab Potential:  Fair





Time/GCodes


Start Time:  08:15


Stop Time:  09:00


Total Time Billed (hr/min):  45


Billed Treatment Time


visit, 45 minutes ADL











PARUL KNOWLES OT Dec 27, 2018 10:28

## 2018-12-27 NOTE — OCCUPATIONAL THER DAILY NOTE
OT Current Status-Daily Note


Subjective


Pt seen in room, up in bed, agreeable to OT. No pain mentioned.





Appearance


Alert, cooperative





Mental Status/Objective


Therapy Code Descriptions/Definitions 





Functional [place] Measure:


0=Not Assessed/NA        4=Minimal Assistance


1=Total Assistance        5=Supervision or Setup


2=Maximal Assistance  6=Modified [place]


3=Moderate Assistance 7=Complete [place]





ADL-Treatment


Therapy Code Descriptions/Definitions 





Functional [place] Measure:


0=Not Assessed/NA        4=Minimal Assistance


1=Total Assistance        5=Supervision or Setup


2=Maximal Assistance  6=Modified [place]


3=Moderate Assistance 7=Complete [place]








Therapy Quality Codes:


6    Independent with activity with or without an assistive device


5    Patient requires set up or clean up by helper.  Patient completes activity

  by  themselves


4    Supervision or touching assist (CGA). [place] provide cues , steadying 

assist


3    The helper provides less than half the effort to complete the activity


2    The helper provides more than half the effort to complete the activity


1    Dependent.  The helper does all the effort to complete an activity 


7    Patient refused to complete or attempt activity


9    The patient did not perform the activity before the current illness or 

injury


88  Not attempted due to Medical conditions or safety concerns





Other Treatment


Pt was able to move from supine to sit EOB without help. He got up from bed 

with CGA, cues to push up from bed instead of pull up on walker (bed raised up)

. Walked SBA, FWW to hip chair and sat without help. Problem-solved to elevate 

R leg on trash can because leg rest on chair too short. Pt completed 15 reps 

bilat UE exercise with 2# weight (only 10 reps bilat shoulder abd). Also 

completed 15 reps bilat UE ex with red theraband, with pt educ on how to do 

each exercise, occasionally with physical cues to do them correctly. To 

strengthen arms to help with transfers and ADLs. Pt left up in chair, all needs 

met.





Education


OT Patient Education:  Exercise program, Purpose of tx/functional activities, 

Safety issues, Transfer techniques


Teaching Recipient:  Patient


Teaching Methods:  Demonstration, Discussion


Response to Teaching:  Verbalize Understanding, Return Demonstration, 

Reinforcement Needed





OT Short Term Goals


Short Term Goals


Time Frame:  Dec 28, 2018


Bathing(FIM):  4


Toileting(FIM):  3


Toilet/Commode Transfer(FIM):  3


Additional Short Term Goals:  1-Demonstrate ADL Tasks, 2-Verbalize Understanding

, 3-ImproveStrength/Deepti


1=Demonstrate adherence to instructed precautions during ADL tasks.


2=Patient will verbalize/demonstrate understanding of assistive devices/

modifications for ADL.


3=Patient will improve strength/tolerance for activity to enable patient to 

perform ADL's.





OT Long Term Goals


Long Term Goals


Time Frame:  2019


Eating (FIM):  6


Eating (QC):  6


Groomin


Oral Hygiene (QC):  6


Bathing(FIM):  5


Shower/Bathe Self (QC):  5


Upper Body Dressing(FIM):  6


Upper Body Dressing (QC):  6


Lower Body Dressing(FIM):  5


Lower Body Dressing (QC):  5


On/Off Footwear (QC):  5


Toileting(FIM):  5


Toileting Hygiene (QC):  5


Toilet/Commode Transfer(FIM):  5


Toilet/Commode Transfer (QC):  5


Shower Transfer(FIM):  5


Additional Goals:  1-Demonstrate ADL Tasks, 2-Verbalize Understanding, 3-

ImproveStrength/Deepti


1=Demonstrate adherence to instructed precautions during ADL tasks.


2=Patient will verbalize/demonstrate understanding of assistive devices/

modifications for ADL.


3=Patient will improve strength/tolerance for activity to enable patient to 

perform ADL's.





OT Education/Plan


Problem List/Assessment


Pt would benefit from skilled OT to increase his independence in basic self 

care.





Discharge Recommendations


Plan/Recommendations:  Continue POC





Treatment Plan/Plan of Care


Patient would benefit from OT for education, treatment and training to promote 

independence in ADL's, mobility, safety and/or upper extremity function for ADL'

s.


Plan of Care:  ADL Retraining, Functional Mobility, Group Exercise/Act as Ind (

education, exercise, socialization, funct mobility, memory), UE Funct Exercise/

Act, UE Neuromus Re-Ed/Coord


Treatment Duration:  2019


Frequency:  At least 5 of 7 days/Wk (IRF)


Estimated Hrs Per Day:  1.5 hours per day (1.25 to 1.5)


Agreement:  Yes


Rehab Potential:  Fair





Time/GCodes


Start Time:  13:00


Stop Time:  13:30


Total Time Billed (hr/min):  30


Billed Treatment Time


visit, 30 minutes exercise











PARUL KNOWLES OT Dec 27, 2018 16:00

## 2018-12-27 NOTE — PHYSICAL THERAPY DAILY NOTE
PT Daily Note-Current


Subjective


Patient just complete with OT and agrees to PT.





Pain





   Numeric Pain Scale:  3


   Location:  Right


   Location Body Site:  Knee


   Pain Description:  Acute





Mental Status


Patient Orientation:  Person, Time, Situation





Transfers


Therapy Code Descriptions/Definitions 





Functional [place] Measure:


0=Not Assessed/NA        4=Minimal Assistance


1=Total Assistance        5=Supervision or Setup


2=Maximal Assistance  6=Modified [place]


3=Moderate Assistance 7=Complete [place]








Therapy Quality Codes:


6    Independent with activity with or without an assistive device


5    Patient requires set up or clean up by helper.  Patient completes activity

  by  themselves


4    Supervision or touching assist (CGA). [place] provide cues , steadying 

assist


3    The helper provides less than half the effort to complete the activity


2    The helper provides more than half the effort to complete the activity


1    Dependent.  The helper does all the effort to complete an activity 


7    Patient refused to complete or attempt activity


9    The patient did not perform the activity before the current illness or 

injury


88  Not attempted due to Medical conditions or safety concerns


Transfers (B, C, W/C) (FIM):  5


Scootin


Rollin


Roll Left to Right (QC):  6


Supine to/from Sit:  6


Sit to/from Stand:  5


Sit to Lying (QC):  6


Sit to Stand (QC):  5


Chair/Bed-to-Chair Xfer(QC):  5


Bed to/from Chair:  5





Weight Bearing


Right Lower Extremity:  Right


Full Weight Bearing


Left Lower Extremity:  Left


Full Weight Bearing





Gait Training


Does the Patient Walk?:  Yes


Gait (FIM):  5


Distance (FIM):  3=150 ft


Distance:  300' x 4


Walk 10 feet (QC):  5


Walk 50 ft with 2 Turns(QC):  5


Walk 150 ft (QC):  5


Gait Level of Assist:  5


Gait Assistive Device:  FWW


skilled cues for body placement in FWW/functional gait sequence with FWW and 

knee brace in place right LE





Exercises


Supine Ex:  Ankle pumps, Quad Set, Hip abd/add


Supine Reps:  15





Assessment


Patient progressing with treatment plan.  Patient has lift chair and FWW 

established prior to this admit per his report.  PT to increase activity as 

tolerated by patient.





PT Long Term Goals


Long Term Goals


PT Long Term Goals Time Frame:  2019


Transfers (B,C,W/C) (FIM):  5


Sit to Lying (QC):  5


Lying-Sitting on Side/Bed(QC):  5


Sit to Stand (QC):  5


Rollin


Roll Left to Right (QC):  5


Chair/Bed-to-Chair Xfer(QC):  5


Car Transfer (QC):  5


Does the Patient Walk:  Yes


Gait (FIM):  5


Gait distance (FIM):  3=150 ft


Distance:  150'


Walk 10 feet (QC):  5


Walk 10ft-Uneven Surface(QC):  5


Walk 50ft with 2 Turns (QC):  5


Walk 150 ft (QC):  5


Gait Level of Assist:  5


Gait Assistive Device:  FWW


Stairs (FIM):  5


# of Steps:  6


1 Step (curb) (QC):  5


4 Steps (QC):  5


12 Steps (QC):  88


Stairs Level Of Assist:  5


Picking up an Object (QC):  5





PT Plan


Treatment/Plan


Treatment Plan:  Continue Plan of Care


Treatment Plan:  Bed Mobility, Education, Functional Activity Deepti, Functional 

Strength, Group Therapy, Gait, Safety, Therapeutic Exercise, Transfers


Treatment Duration:  2019


Frequency:  At least 5 of 7 days/Wk (IRF)


Estimated Hrs Per Day:  1.5 hours per day


Patient and/or Family Agrees t:  Yes





Time/GCodes


Time In:  1335


Time Out:  1400


Total Billed Treatment Time:  25


Total Billed Treatment


1 visit


FA x 2 25 min











CHANDRA BURK PT Dec 27, 2018 14:37

## 2018-12-27 NOTE — PROGRESS NOTE-HOSPITALIST
Subjective


HPI/CC On Admission


Date Seen by Provider:  Dec 27, 2018


Time Seen by Provider:  10:00


Subjective/Events-last exam


Patient doing well


Had a wash rag on his head for unknown reason


Overall feels like he is walking better


Bowels are moving


Leg pain is improved





Review of Systems


Musculoskeletal:  leg pain





Objective


Exam


Vital Signs





Vital Signs








  Date Time  Temp Pulse Resp B/P (MAP) Pulse Ox O2 Delivery O2 Flow Rate FiO2


 


18 09:27     95 Room Air  


 


18 05:58 96.7 81 18 106/66 (79)    





Capillary Refill : Less Than 3 Seconds


General Appearance:  No Apparent Distress, WD/WN, Chronically ill


Respiratory:  Chest Non Tender, Lungs Clear, Normal Breath Sounds, No Accessory 

Muscle Use, No Respiratory Distress


Cardiovascular:  Regular Rate, Rhythm, No Edema, No Gallop, No JVD, Normal 

Peripheral Pulses, Systolic Murmur


Neurologic/Psychiatric:  Alert, Oriented x3, No Motor/Sensory Deficits, Normal 

Mood/Affect





Results/Procedures


Lab


Patient resulted labs reviewed.





Assessment/Plan


Assessment and Plan


Assess & Plan/Chief Complaint


Assessment:


Right quadriceps tendon disruption with medial and lateral meniscus tears 

prohibiting ambulation


COPD


Cardiac murmur


Depression


Suicidal statements now he denies he stated it





Plan:


Inpatient rehabilitation protocol


Ambulate as much as possible


Appreciate orthopedics





Diagnosis/Problems


Diagnosis/Problems





(1) Unspecified injury of right quadriceps muscle, fascia and tendon, initial 

encounter


Status:  Acute


(2) COPD (chronic obstructive pulmonary disease)


Status:  Chronic


Qualifiers:  


   COPD type:  unspecified COPD  Qualified Codes:  J44.9 - Chronic obstructive 

pulmonary disease, unspecified


(3) Cardiac murmur


Status:  Chronic





Clinical Quality Measures


DVT/VTE Risk/Contraindication:


Risk Factor Score Per Nursin


RFS Level Per Nursing on Admit:  4+=Very High











REED EGAN DO Dec 27, 2018 11:06

## 2018-12-27 NOTE — SPEECH THERAPY DAILY NOTE
Speech Daily Progress Note


Subjective


Date Seen by Provider:  Dec 27, 2018


Time Seen by Provider:  00:30


Patient was resting in bed when I entered his room today.





Objective


Patient completed a series of memory tasks with 80% accuracy given minimal 

repetitions.





Assessment


Assessment Current Status:  Good Progress





Communication


Comprehension:  2


Expression:  3





Social Cognition


Social Interaction:  3


Problem Solvin


Memory:  2





Speech Short Term Goals


Short Term Goals


Short Term Goals


1) Patient will recall information/procedures related to personal safety with 80

% or greater accuracy.


2) Patient will demo follow through with 1-2 step directions with 80% or 

greater accuracy.


3) Patient will complete memory tasks with 80% or greater accuracy.





Speech Long Term Goals


Long Term Goals


Patient will improve safety awareness and independence for personal needs in 

order to return home safely.





Speech-Plan


Patient/Family Goals


Patient/Family Goals:  


Patient plans to return home with his wife post rehab.





Treatment Plan


Speech Therapy Treatment Plan:  Continue Plan of Care


Patient is progressing well with skilled therapy.


Treatment Duration:  Dec 28, 2018


Frequency:  5 times per week


Estimated Hrs Per Day:  .5 hour per day


Rehab Potential:  Fair


Barriers to Learning:  


Patient gets confused at times.


Pt/Family Agrees to Plan:  Yes





Safety Risks/Education


Teaching Recipient:  Patient


Teaching Methods:  Discussion


Response to Teaching:  Verbalize Understanding


Education Topics Provided:  


Safety





Time


Speech Therapy Time In:  10:30


Speech Therapy Time Out:  11:00


Total Billed Time:  30


Billed Treatment Time


[address] Dec 27, 2018 15:17

## 2018-12-27 NOTE — NUR
MSW met with patient to review Team Conference Summary. As patient has declined within the 
last few days and is now ambulating shorter distances, requiring max A for LB dressing, Mod 
A for toileting, min A for transfers and has experienced vertigo which has limited activity, 
Team has recommended progress be re-evaluated on Monday, 12/31. Patient is agreeable to 
this.

## 2018-12-27 NOTE — PHYSICAL THERAPY DAILY NOTE
PT Daily Note-Current


Subjective


Pt states he is still lightheaded today, but not dizzy.





Pain





   Numeric Pain Scale:  1


   Comment:  R leg





Appearance


Pt in therapy gym on mat with OT pre PT treatment, agreeable to PT


Pt supine on mat, max assist to readjust R leg brace


At end of session, pt sitting up in chair with call light, phone and bedside 

table within reach. All needs met





Mental Status


right knee brace





Transfers


Therapy Code Descriptions/Definitions 





Functional [place] Measure:


0=Not Assessed/NA        4=Minimal Assistance


1=Total Assistance        5=Supervision or Setup


2=Maximal Assistance  6=Modified [place]


3=Moderate Assistance 7=Complete [place]








Therapy Quality Codes:


6    Independent with activity with or without an assistive device


5    Patient requires set up or clean up by helper.  Patient completes activity

  by  themselves


4    Supervision or touching assist (CGA). [place] provide cues , steadying 

assist


3    The helper provides less than half the effort to complete the activity


2    The helper provides more than half the effort to complete the activity


1    Dependent.  The helper does all the effort to complete an activity 


7    Patient refused to complete or attempt activity


9    The patient did not perform the activity before the current illness or 

injury


88  Not attempted due to Medical conditions or safety concerns


Transfers (B, C, W/C) (FIM):  5


Scootin


Rollin


Roll Left to Right (QC):  5


Supine to/from Sit:  5


Sit to/from Stand:  5


Sit to Lying (QC):  5


Sit to Stand (QC):  5


Chair/Bed-to-Chair Xfer(QC):  5


Bed to/from Chair:  5


sit to from stand transfers performed from elevated bed and chair height





Weight Bearing


Right Lower Extremity:  Right


Full Weight Bearing


Left Lower Extremity:  Left


Full Weight Bearing





Gait Training


Does the Patient Walk?:  Yes


Distance (FIM):  3=150 ft


Distance:  350x2


Gait Level of Assist:  5


Gait Persons Needed:  1


Gait Assistive Device:  FWW


verb inst to stand erect and not lean on walker, decreased WB RLE but 

improving. Adjusted height of walker to assist pt in improving posture and 

promoting increased WB RLE and to improve step through gait pattern





Exercises


Seated Therapy Exercises:  Ankle pumps, Sit to stand, Long arc quads, Chair 

press-ups, Hip flexion, Hamstring Curls, Hip abd/add


Seated Reps:  20


pt requiring encouragement to continue with reps during ex





Treatments


transfer, safety and gait training. ROM and strengthening exercise





Assessment


Current Status:  Fair Progress


pt demo some self limitting behaviors today, min to mod encouragement to 

redirect pt





PT Long Term Goals


Long Term Goals


PT Long Term Goals Time Frame:  2019


Transfers (B,C,W/C) (FIM):  5


Sit to Lying (QC):  5


Lying-Sitting on Side/Bed(QC):  5


Sit to Stand (QC):  5


Rollin


Roll Left to Right (QC):  5


Chair/Bed-to-Chair Xfer(QC):  5


Car Transfer (QC):  5


Does the Patient Walk:  Yes


Gait (FIM):  5


Gait distance (FIM):  3=150 ft


Distance:  150'


Walk 10 feet (QC):  5


Walk 10ft-Uneven Surface(QC):  5


Walk 50ft with 2 Turns (QC):  5


Walk 150 ft (QC):  5


Gait Level of Assist:  5


Gait Assistive Device:  FWW


Stairs (FIM):  5


# of Steps:  6


1 Step (curb) (QC):  5


4 Steps (QC):  5


12 Steps (QC):  88


Stairs Level Of Assist:  5


Picking up an Object (QC):  5





PT Plan


Problem List


Problem List:  Activity Tolerance, Functional Strength, Safety, Balance, Gait, 

Transfer, Bed Mobility, ROM





Treatment/Plan


Treatment Plan:  Continue Plan of Care


Treatment Plan:  Bed Mobility, Education, Functional Activity Deepti, Functional 

Strength, Group Therapy, Gait, Safety, Therapeutic Exercise, Transfers


Treatment Duration:  2019


Frequency:  At least 5 of 7 days/Wk (IRF)


Estimated Hrs Per Day:  1.5 hours per day


Patient and/or Family Agrees t:  Yes





Safety Risks/Education


Patient Education:  Gait Training, Transfer Techniques, Safety Issues


Teaching Recipient:  Patient


Teaching Methods:  Demonstration, Discussion


Response to Teaching:  Verbalize Understanding, Return Demonstration, 

Reinforcement Needed


continues to require re instruction for safety and technique during transitions





Time/GCodes


Time In:  900


Time Out:  1000


Total Billed Treatment Time:  60


Total Billed Treatment


1 visit


Gt x15 min


FA x30 min


EX x15 min











SHOBHA CHANG PTA Dec 27, 2018 09:34

## 2018-12-28 VITALS — SYSTOLIC BLOOD PRESSURE: 127 MMHG | DIASTOLIC BLOOD PRESSURE: 66 MMHG

## 2018-12-28 VITALS — DIASTOLIC BLOOD PRESSURE: 69 MMHG | SYSTOLIC BLOOD PRESSURE: 118 MMHG

## 2018-12-28 RX ADMIN — OXYCODONE HYDROCHLORIDE AND ACETAMINOPHEN PRN TAB: 5; 325 TABLET ORAL at 09:17

## 2018-12-28 RX ADMIN — FLUTICASONE PROPIONATE AND SALMETEROL XINAFOATE SCH PUFF: 115; 21 AEROSOL, METERED RESPIRATORY (INHALATION) at 09:12

## 2018-12-28 RX ADMIN — ENOXAPARIN SODIUM SCH MG: 100 INJECTION SUBCUTANEOUS at 15:00

## 2018-12-28 NOTE — THERAPY GROUP DAILY NOTE
Therapy Daily Group Note


Patient Education Topic


Other List Below





Exercises


Other





Other/Notes


Pt was an active participant in OT/PT group. He introduced himself but had some 

difficulty recalling a joke to share. He contributed to discussion/education on 

memory strategies and shared a personal strategy that he uses for memory. He 

also participated successfully in a memory activity, applying strategies. He 

did deep breathing exercises and UE stretching, to help with relaxation and to 

get adequate oxygenation during exercise. He walked back to his room and was 

left up in hip chair, chair alarm on, all needs met.


Start Time:  13:00


Stop Time:  14:20


Total Billed Treatment Time:  80


Total Billed Treatment


visit, 80 minutes group











PARUL KNOWLES OT Dec 28, 2018 14:42

## 2018-12-28 NOTE — PROGRESS NOTE-STANDARD
Standard Progress Note


Progress Notes/Assess & Plan


Date Seen by a Provider:  Dec 28, 2018


Time Seen by a Provider:  06:10


Progress/Assessment & Plan


no current complaints


 RLE brace and dressing in place


NVI distally


 s/p R quad repair


ok to wbat in brace


Final Diagnosis


no complaints


RLE--dressing intact. No calf tenderness


s/p R quad repair


continue mobilizing











SHILOH LOBO MD Dec 28, 2018 06:28

## 2018-12-28 NOTE — OCCUPATIONAL THER DAILY NOTE
OT Current Status-Daily Note


Subjective


Pt seen in room, up in chair, agreeable to OT. No pain mentioned.





Appearance


Alert, cooperative





Mental Status/Objective


Therapy Code Descriptions/Definitions 





Functional [place] Measure:


0=Not Assessed/NA        4=Minimal Assistance


1=Total Assistance        5=Supervision or Setup


2=Maximal Assistance  6=Modified [place]


3=Moderate Assistance 7=Complete [place]





ADL-Treatment


Pt washed and dried all parts except lower legs, doing sponge bath. Stood with 

SBA to wash bottom. Able to doff and don t-shirt with setup. Stood SBA to take 

pants off, FWW and was able to get them off brace and feet using dressing 

stick. He was able to get pants on over his feet but struggled a little due to 

bulkiness of leg brace. He stood SBA, FWW to pull pants up over hips. He also 

cleaned his dentures and mouth with setup, applying denture adhesive. All ADLs 

took longer than usual. He most likely would not have been able to stand at the 

sink long enough today to clean his teeth. He recalled that he could get his 

clothes washed on the unit. Pt requested to return to bed and transferred from 

chair and to bed with SBA, FWW. He was able to get both legs into bed. Pt left 

up in bed, 4 rails up, bed alarm on, all needs met.


Therapy Code Descriptions/Definitions 





Functional [place] Measure:


0=Not Assessed/NA        4=Minimal Assistance


1=Total Assistance        5=Supervision or Setup


2=Maximal Assistance  6=Modified [place]


3=Moderate Assistance 7=Complete [place]








Therapy Quality Codes:


6    Independent with activity with or without an assistive device


5    Patient requires set up or clean up by helper.  Patient completes activity

  by  themselves


4    Supervision or touching assist (CGA). [place] provide cues , steadying 

assist


3    The helper provides less than half the effort to complete the activity


2    The helper provides more than half the effort to complete the activity


1    Dependent.  The helper does all the effort to complete an activity 


7    Patient refused to complete or attempt activity


9    The patient did not perform the activity before the current illness or 

injury


88  Not attempted due to Medical conditions or safety concerns


Grooming (FIM):  5 (setup)


Bathing (FIM):  5 (SBA for standing. Washed and dried all parts except elected 

not to do feet, sponge bath. )


Upper Body (FIM):  5 (setup)


Lower Body Dressing (FIM):  5 (Doffed and donned pants, SBA when standing, FWW. 

Dressing stick)


Transfers (B, C, W/C) (FIM):  5 (SBA, FWW from chair and into bed)





Education


OT Patient Education:  Modified ADL techniques, Progress toward Goal/Update tx 

plan, Purpose of tx/functional activities, Use of adapted equipment


Teaching Recipient:  Patient


Teaching Methods:  Demonstration, Discussion


Response to Teaching:  Verbalize Understanding, Return Demonstration





OT Short Term Goals


Short Term Goals


Time Frame:  Dec 28, 2018


Bathing(FIM):  4


Toileting(FIM):  3


Toilet/Commode Transfer(FIM):  3


Additional Short Term Goals:  1-Demonstrate ADL Tasks, 2-Verbalize Understanding

, 3-ImproveStrength/Deepti


1=Demonstrate adherence to instructed precautions during ADL tasks.


2=Patient will verbalize/demonstrate understanding of assistive devices/

modifications for ADL.


3=Patient will improve strength/tolerance for activity to enable patient to 

perform ADL's.





OT Long Term Goals


Long Term Goals


Time Frame:  2019


Eating (FIM):  6


Eating (QC):  6


Groomin


Oral Hygiene (QC):  6


Bathing(FIM):  5


Shower/Bathe Self (QC):  5


Upper Body Dressing(FIM):  6


Upper Body Dressing (QC):  6


Lower Body Dressing(FIM):  5


Lower Body Dressing (QC):  5


On/Off Footwear (QC):  5


Toileting(FIM):  5


Toileting Hygiene (QC):  5


Toilet/Commode Transfer(FIM):  5


Toilet/Commode Transfer (QC):  5


Shower Transfer(FIM):  5


Additional Goals:  1-Demonstrate ADL Tasks, 2-Verbalize Understanding, 3-

ImproveStrength/Deepti


1=Demonstrate adherence to instructed precautions during ADL tasks.


2=Patient will verbalize/demonstrate understanding of assistive devices/

modifications for ADL.


3=Patient will improve strength/tolerance for activity to enable patient to 

perform ADL's.





OT Education/Plan


Problem List/Assessment


Pt would benefit from skilled OT to increase his independence in basic self 

care.





Discharge Recommendations


Plan/Recommendations:  Continue POC





Treatment Plan/Plan of Care


Patient would benefit from OT for education, treatment and training to promote 

independence in ADL's, mobility, safety and/or upper extremity function for ADL'

s.


Plan of Care:  ADL Retraining, Functional Mobility, Group Exercise/Act as Ind (

education, exercise, socialization, funct mobility, memory), UE Funct Exercise/

Act, UE Neuromus Re-Ed/Coord


Treatment Duration:  2019


Frequency:  At least 5 of 7 days/Wk (IRF)


Estimated Hrs Per Day:  1.5 hours per day (1.25 to 1.5)


Agreement:  Yes


Rehab Potential:  Fair





Time/GCodes


Start Time:  09:30


Stop Time:  10:20


Total Time Billed (hr/min):  50


Billed Treatment Time


visit, 50 minutes ADL











PARUL KNOWLES OT Dec 28, 2018 10:27

## 2018-12-28 NOTE — PROGRESS NOTE-HOSPITALIST
Subjective


HPI/CC On Admission


Date Seen by Provider:  Dec 28, 2018


Time Seen by Provider:  11:00


Subjective/Events-last exam


Patient doing well


Ready to take a nap


Talked about a murmur


He had an echocardiogram several years ago





Review of Systems


Musculoskeletal:  leg pain





Objective


Exam


Vital Signs





Vital Signs








  Date Time  Temp Pulse Resp B/P (MAP) Pulse Ox O2 Delivery O2 Flow Rate FiO2


 


18 09:12     98 Room Air  


 


18 05:08 97.0 75 20 127/66 (86)    





Capillary Refill : Less Than 3 Seconds


General Appearance:  No Apparent Distress, WD/WN, Chronically ill


Respiratory:  Chest Non Tender, Lungs Clear, Normal Breath Sounds, No Accessory 

Muscle Use, No Respiratory Distress


Cardiovascular:  Regular Rate, Rhythm, No Edema, No Gallop, No JVD, Normal 

Peripheral Pulses, Systolic Murmur


Neurologic/Psychiatric:  Alert, Oriented x3, No Motor/Sensory Deficits, Normal 

Mood/Affect





Results/Procedures


Lab


Patient resulted labs reviewed.





Assessment/Plan


Assessment and Plan


Assess & Plan/Chief Complaint


Assessment:


Right quadriceps tendon disruption with medial and lateral meniscus tears 

prohibiting ambulation


COPD


Cardiac murmur


Depression


Suicidal statements now he denies he stated it





Plan:


Inpatient rehabilitation protocol


Ambulate as much as possible


Appreciate orthopedics





Diagnosis/Problems


Diagnosis/Problems





(1) Unspecified injury of right quadriceps muscle, fascia and tendon, initial 

encounter


Status:  Acute


(2) COPD (chronic obstructive pulmonary disease)


Status:  Chronic


Qualifiers:  


   COPD type:  unspecified COPD  Qualified Codes:  J44.9 - Chronic obstructive 

pulmonary disease, unspecified


(3) Cardiac murmur


Status:  Chronic





Clinical Quality Measures


DVT/VTE Risk/Contraindication:


Risk Factor Score Per Nursin


RFS Level Per Nursing on Admit:  4+=Very High











REED EGAN DO Dec 28, 2018 11:29

## 2018-12-28 NOTE — PHYSICAL THERAPY DAILY NOTE
PT Daily Note-Current


Subjective


Pt reports feels he is getting a little stronger. Continues to c/o 

lightheadedness on occasion





Pain





   Numeric Pain Scale:  7


   Location:  Right


   Comment:  leg/thigh





Appearance


Pre therapy, pt supine in bed with HOB elevated, easily aroused, agreeable to 

PT treatment


At end of session, pt requesting to sit on toilet, pt on toilet with call light 

within reach. All other needs met at this time





Mental Status


Patient Orientation:  Person, Place, Time, Eyes Open, Situation, Normal For Age


Attachments:  Knee Immobilizer





Transfers


Therapy Code Descriptions/Definitions 





Functional [place] Measure:


0=Not Assessed/NA        4=Minimal Assistance


1=Total Assistance        5=Supervision or Setup


2=Maximal Assistance  6=Modified [place]


3=Moderate Assistance 7=Complete [place]








Therapy Quality Codes:


6    Independent with activity with or without an assistive device


5    Patient requires set up or clean up by helper.  Patient completes activity

  by  themselves


4    Supervision or touching assist (CGA). [place] provide cues , steadying 

assist


3    The helper provides less than half the effort to complete the activity


2    The helper provides more than half the effort to complete the activity


1    Dependent.  The helper does all the effort to complete an activity 


7    Patient refused to complete or attempt activity


9    The patient did not perform the activity before the current illness or 

injury


88  Not attempted due to Medical conditions or safety concerns


Transfers (B, C, W/C) (FIM):  5


Scootin


Rollin


Supine to/from Sit:  6


Sit to/from Stand:  5





Weight Bearing


Right Lower Extremity:  Right


Full Weight Bearing


Left Lower Extremity:  Left


Full Weight Bearing





Gait Training


Does the Patient Walk?:  Yes


Gait (FIM):  5


Distance (FIM):  3=150 ft


Distance:  500, 300


Gait Level of Assist:  5


Gait Persons Needed:  1


Gait Assistive Device:  FWW


verb inst to improve gait quality with step through gait pattern, increasing WB 

through RLE, standing erect and maintaining safe distance to walker. RLE 

immobilizer





Exercises


Seated Therapy Exercises:  Ankle pumps, Sit to stand, Long arc quads, Hip 

flexion, Hamstring Curls, Hip abd/add


Seated Reps:  20





Treatments


Gait and transfer and safety training, strengthening exercises





Assessment


Current Status:  Good Progress





PT Long Term Goals


Long Term Goals


PT Long Term Goals Time Frame:  2019


Transfers (B,C,W/C) (FIM):  5


Sit to Lying (QC):  5


Lying-Sitting on Side/Bed(QC):  5


Sit to Stand (QC):  5


Rollin


Roll Left to Right (QC):  5


Chair/Bed-to-Chair Xfer(QC):  5


Car Transfer (QC):  5


Does the Patient Walk:  Yes


Gait (FIM):  5


Gait distance (FIM):  3=150 ft


Distance:  150'


Walk 10 feet (QC):  5


Walk 10ft-Uneven Surface(QC):  5


Walk 50ft with 2 Turns (QC):  5


Walk 150 ft (QC):  5


Gait Level of Assist:  5


Gait Assistive Device:  FWW


Stairs (FIM):  5


# of Steps:  6


1 Step (curb) (QC):  5


4 Steps (QC):  5


12 Steps (QC):  88


Stairs Level Of Assist:  5


Picking up an Object (QC):  5





PT Plan


Problem List


Problem List:  Activity Tolerance, Functional Strength, Safety, Balance, Gait, 

Transfer, Bed Mobility, ROM





Treatment/Plan


Treatment Plan:  Continue Plan of Care


Treatment Plan:  Bed Mobility, Education, Functional Activity Deepti, Functional 

Strength, Group Therapy, Gait, Safety, Therapeutic Exercise, Transfers


Treatment Duration:  2019


Frequency:  At least 5 of 7 days/Wk (IRF)


Estimated Hrs Per Day:  1.5 hours per day


Patient and/or Family Agrees t:  Yes





Safety Risks/Education


Patient Education:  Gait Training, Transfer Techniques, Safety Issues


Teaching Recipient:  Patient


Teaching Methods:  Demonstration, Discussion


Response to Teaching:  Verbalize Understanding, Return Demonstration





Time/GCodes


Time In:  845


Time Out:  930


Total Billed Treatment Time:  45


Total Billed Treatment


1 visit


GT x30 min


FA x15 min











SHOBHA CHANG PTA Dec 28, 2018 09:15

## 2018-12-28 NOTE — SPEECH THERAPY DAILY NOTE
Speech Daily Progress Note


Subjective


Date Seen by Provider:  Dec 28, 2018


Time Seen by Provider:  00:30


Patient was resting in bed watching television when I arrived.





Objective


Patient completed a series of problem solving tasks with 80% accuracy given 

minimal cues.





Assessment


Assessment Current Status:  Good Progress





Treatment Plan


Continue Plan of Care





Communication


Comprehension:  2


Expression:  3





Social Cognition


Social Interaction:  3


Problem Solvin


Memory:  2





Speech Short Term Goals


Short Term Goals


Short Term Goals


1) Patient will recall information/procedures related to personal safety with 80

% or greater accuracy.


2) Patient will demo follow through with 1-2 step directions with 80% or 

greater accuracy.


3) Patient will complete memory tasks with 80% or greater accuracy.





Speech Long Term Goals


Long Term Goals


Patient will improve safety awareness and independence for personal needs in 

order to return home safely.





Speech-Plan


Patient/Family Goals


Patient/Family Goals:  


Patient plans to return home with his wife post rehab.





Treatment Plan


Speech Therapy Treatment Plan:  Continue Plan of Care


Patient is progressing well with skilled ST services.


Treatment Duration:  Dec 31, 2018


Frequency:  5 times per week


Estimated Hrs Per Day:  .5 hour per day


Rehab Potential:  Fair


Barriers to Learning:  


Patient gets confused easily.


Pt/Family Agrees to Plan:  Yes





Safety Risks/Education


Teaching Recipient:  Patient


Teaching Methods:  Discussion


Response to Teaching:  Verbalize Understanding


Education Topics Provided:  


Safety and call light utilization.





Time


Speech Therapy Time In:  10:30


Speech Therapy Time Out:  11:00


Total Billed Time:  30


Billed Treatment Time


[address]GAIL [address] ST Dec 28, 2018 12:24

## 2018-12-29 VITALS — DIASTOLIC BLOOD PRESSURE: 70 MMHG | SYSTOLIC BLOOD PRESSURE: 112 MMHG

## 2018-12-29 VITALS — SYSTOLIC BLOOD PRESSURE: 112 MMHG | DIASTOLIC BLOOD PRESSURE: 74 MMHG

## 2018-12-29 RX ADMIN — FLUTICASONE PROPIONATE AND SALMETEROL XINAFOATE SCH PUFF: 115; 21 AEROSOL, METERED RESPIRATORY (INHALATION) at 08:19

## 2018-12-29 RX ADMIN — ENOXAPARIN SODIUM SCH MG: 100 INJECTION SUBCUTANEOUS at 14:27

## 2018-12-29 NOTE — PROGRESS NOTE-HOSPITALIST
Subjective


HPI/CC On Admission


Date Seen by Provider:  Dec 29, 2018


Time Seen by Provider:  11:30


Subjective/Events-last exam


Patient's sleeping soundly during my exam


RN has no concerns


Leg pain is improving


Ambulating better





Review of Systems


Musculoskeletal:  leg pain





Objective


Exam


Vital Signs





Vital Signs








  Date Time  Temp Pulse Resp B/P (MAP) Pulse Ox O2 Delivery O2 Flow Rate FiO2


 


18 08:20      Room Air  


 


18 08:20     96   


 


18 05:34 97.2 58 18 112/74 (87)    





Capillary Refill : Less Than 3 Seconds


General Appearance:  No Apparent Distress, WD/WN


Respiratory:  Chest Non Tender, Lungs Clear, Normal Breath Sounds, No Accessory 

Muscle Use, No Respiratory Distress


Cardiovascular:  Regular Rate, Rhythm, No Edema, No Gallop, No JVD, Normal 

Peripheral Pulses, Systolic Murmur


Neurologic/Psychiatric:  Other


Lymphatic:  Other (Sleeping)





Results/Procedures


Lab


Patient resulted labs reviewed.





Assessment/Plan


Assessment and Plan


Assess & Plan/Chief Complaint


Assessment:


Right quadriceps tendon disruption with medial and lateral meniscus tears 

prohibiting ambulation


COPD


Cardiac murmur


Depression


Suicidal statements now he denies he stated it





Plan:


Inpatient rehabilitation protocol


Ambulate as much as possible


Appreciate orthopedics





Diagnosis/Problems


Diagnosis/Problems





(1) Unspecified injury of right quadriceps muscle, fascia and tendon, initial 

encounter


Status:  Acute


(2) COPD (chronic obstructive pulmonary disease)


Status:  Chronic


Qualifiers:  


   COPD type:  unspecified COPD  Qualified Codes:  J44.9 - Chronic obstructive 

pulmonary disease, unspecified


(3) Cardiac murmur


Status:  Chronic





Clinical Quality Measures


DVT/VTE Risk/Contraindication:


Risk Factor Score Per Nursin


RFS Level Per Nursing on Admit:  4+=Very High











REED EGAN DO Dec 29, 2018 12:30

## 2018-12-29 NOTE — PM&R PROGRESS NOTE
Subjective


This was a face to face visit with the patient.


Date Seen by Provider:  Dec 29, 2018


Time Seen by Provider:  07:45


Subjective/Events-last exam


Patient was seen in his room this AM Patient SBA for transfers Patient with 

telesitter due to memory impairment


Review of Systems


Musculoskeletal:  leg pain


Neurological:  Confusion





Objective


Physician Exam


Last Set of Vital Signs





Vital Signs








  Date Time  Temp Pulse Resp B/P (MAP) Pulse Ox O2 Delivery O2 Flow Rate FiO2


 


12/29/18 05:34 97.2 58 18 112/74 (87) 96 Room Air  





Capillary Refill : Less Than 3 Seconds


I&O











Intake and Output 


 


 12/29/18





 00:00


 


Intake Total 990 ml


 


Output Total 700 ml


 


Balance 290 ml


 


 


 


Intake Oral 990 ml


 


Output Urine Total 700 ml


 


# Voids 3


 


# Bowel Movements 1








General:  Alert, Cooperative, No Acute Distress


HEENT:  Atraumatic, PERRLA, EOMI, Mucous Memb Moist/Pink


Neck:  Supple, No JVD


Lungs:  Clear to Auscultation


Heart:  Regular Rate


Abdomen:  Normal Bowel Sounds, Soft, No Tenderness


Extremities:  Other (rt knee brace)


Neuro:  Other (strength impaired s/p rt Quad repair)





Assessment/Plan


Assessment and Plan


Fall with rt Quad tear s/p repair DR Cottrell


DVT prophylaxis lovenox subcut


Mild cognitive deficits


Plan Continue PT/OT/ST


Team Conference 1-2-19


F/U with Ortho and Hospitalist PRN


Co-Morbidities that are continuing to impact the rehab process: (include details

)











JED BEAR MD Dec 29, 2018 08:20

## 2018-12-29 NOTE — PHYSICAL THERAPY DAILY NOTE
PT Daily Note-Current


Subjective


States that he is doing okay.





Pain





   Numeric Pain Scale:  0-No Pain





Transfers


Therapy Code Descriptions/Definitions 





Functional [place] Measure:


0=Not Assessed/NA        4=Minimal Assistance


1=Total Assistance        5=Supervision or Setup


2=Maximal Assistance  6=Modified [place]


3=Moderate Assistance 7=Complete [place]








Therapy Quality Codes:


6    Independent with activity with or without an assistive device


5    Patient requires set up or clean up by helper.  Patient completes activity

  by  themselves


4    Supervision or touching assist (CGA). [place] provide cues , steadying 

assist


3    The helper provides less than half the effort to complete the activity


2    The helper provides more than half the effort to complete the activity


1    Dependent.  The helper does all the effort to complete an activity 


7    Patient refused to complete or attempt activity


9    The patient did not perform the activity before the current illness or 

injury


88  Not attempted due to Medical conditions or safety concerns


Transfers (B, C, W/C) (FIM):  5


Scootin


Rollin


Supine to/from Sit:  5


Sit to/from Stand:  5





Weight Bearing


Right Lower Extremity:  Right


Full Weight Bearing


Left Lower Extremity:  Left


Full Weight Bearing





Gait Training


Gait (FIM):  5


Distance:  400'


Gait Level of Assist:  5


Gait Persons Needed:  1


Gait Assistive Device:  FWW





Exercises


Seated Therapy Exercises:  LE Protocol


Seated Reps:  15





Assessment


Current Status:  Excellent Progress


The patient did well with all activities.





PT Long Term Goals


Long Term Goals


PT Long Term Goals Time Frame:  2019


Transfers (B,C,W/C) (FIM):  5


Sit to Lying (QC):  5


Lying-Sitting on Side/Bed(QC):  5


Sit to Stand (QC):  5


Rollin


Roll Left to Right (QC):  5


Chair/Bed-to-Chair Xfer(QC):  5


Car Transfer (QC):  5


Does the Patient Walk:  Yes


Gait (FIM):  5


Gait distance (FIM):  3=150 ft


Distance:  150'


Walk 10 feet (QC):  5


Walk 10ft-Uneven Surface(QC):  5


Walk 50ft with 2 Turns (QC):  5


Walk 150 ft (QC):  5


Gait Level of Assist:  5


Gait Assistive Device:  FWW


Stairs (FIM):  5


# of Steps:  6


1 Step (curb) (QC):  5


4 Steps (QC):  5


12 Steps (QC):  88


Stairs Level Of Assist:  5


Picking up an Object (QC):  5





PT Plan


Treatment/Plan


Treatment Plan:  Continue Plan of Care


Treatment Plan:  Bed Mobility, Education, Functional Activity Deepti, Functional 

Strength, Group Therapy, Gait, Safety, Therapeutic Exercise, Transfers


Treatment Duration:  2019


Frequency:  At least 5 of 7 days/Wk (IRF)


Estimated Hrs Per Day:  1.5 hours per day


Patient and/or Family Agrees t:  Yes





Time/GCodes


Time In:  1010


Time Out:  1030


Total Billed Treatment Time:  20


Total Billed Treatment


1, GT x 15'











ALVIN GORDILLO PT Dec 29, 2018 11:06

## 2018-12-30 VITALS — DIASTOLIC BLOOD PRESSURE: 74 MMHG | SYSTOLIC BLOOD PRESSURE: 117 MMHG

## 2018-12-30 VITALS — SYSTOLIC BLOOD PRESSURE: 127 MMHG | DIASTOLIC BLOOD PRESSURE: 61 MMHG

## 2018-12-30 LAB
ALBUMIN SERPL-MCNC: 3.6 GM/DL (ref 3.2–4.5)
ALP SERPL-CCNC: 72 U/L (ref 40–136)
ALT SERPL-CCNC: 48 U/L (ref 0–55)
BASOPHILS # BLD AUTO: 0 10^3/UL (ref 0–0.1)
BASOPHILS NFR BLD AUTO: 1 % (ref 0–10)
BILIRUB SERPL-MCNC: 0.4 MG/DL (ref 0.1–1)
BUN/CREAT SERPL: 17
CALCIUM SERPL-MCNC: 9.1 MG/DL (ref 8.5–10.1)
CHLORIDE SERPL-SCNC: 110 MMOL/L (ref 98–107)
CO2 SERPL-SCNC: 18 MMOL/L (ref 21–32)
CREAT SERPL-MCNC: 0.92 MG/DL (ref 0.6–1.3)
EOSINOPHIL # BLD AUTO: 0.1 10^3/UL (ref 0–0.3)
EOSINOPHIL NFR BLD AUTO: 2 % (ref 0–10)
ERYTHROCYTE [DISTWIDTH] IN BLOOD BY AUTOMATED COUNT: 13.3 % (ref 10–14.5)
GFR SERPLBLD BASED ON 1.73 SQ M-ARVRAT: > 60 ML/MIN
GLUCOSE SERPL-MCNC: 115 MG/DL (ref 70–105)
HCT VFR BLD CALC: 36 % (ref 40–54)
HGB BLD-MCNC: 11.9 G/DL (ref 13.3–17.7)
LYMPHOCYTES # BLD AUTO: 1.6 X 10^3 (ref 1–4)
LYMPHOCYTES NFR BLD AUTO: 26 % (ref 12–44)
MANUAL DIFFERENTIAL PERFORMED BLD QL: NO
MCH RBC QN AUTO: 33 PG (ref 25–34)
MCHC RBC AUTO-ENTMCNC: 33 G/DL (ref 32–36)
MCV RBC AUTO: 101 FL (ref 80–99)
MONOCYTES # BLD AUTO: 0.6 X 10^3 (ref 0–1)
MONOCYTES NFR BLD AUTO: 9 % (ref 0–12)
NEUTROPHILS # BLD AUTO: 3.8 X 10^3 (ref 1.8–7.8)
NEUTROPHILS NFR BLD AUTO: 62 % (ref 42–75)
PLATELET # BLD: 338 10^3/UL (ref 130–400)
PMV BLD AUTO: 8.8 FL (ref 7.4–10.4)
POTASSIUM SERPL-SCNC: 4.2 MMOL/L (ref 3.6–5)
PROT SERPL-MCNC: 6.8 GM/DL (ref 6.4–8.2)
RBC # BLD AUTO: 3.59 10^6/UL (ref 4.35–5.85)
SODIUM SERPL-SCNC: 139 MMOL/L (ref 135–145)
WBC # BLD AUTO: 6.1 10^3/UL (ref 4.3–11)

## 2018-12-30 RX ADMIN — ENOXAPARIN SODIUM SCH MG: 100 INJECTION SUBCUTANEOUS at 15:32

## 2018-12-30 RX ADMIN — FLUTICASONE PROPIONATE AND SALMETEROL XINAFOATE SCH PUFF: 115; 21 AEROSOL, METERED RESPIRATORY (INHALATION) at 09:33

## 2018-12-30 RX ADMIN — OXYCODONE HYDROCHLORIDE AND ACETAMINOPHEN PRN TAB: 5; 325 TABLET ORAL at 06:02

## 2018-12-30 NOTE — PROGRESS NOTE-HOSPITALIST
Subjective


HPI/CC On Admission


Date Seen by Provider:  Dec 30, 2018


Time Seen by Provider:  11:30


Subjective/Events-last exam


Patient doing well


Overall ambulating pretty well


Leg pain is decreasing


Bowels are moving


Tums helped his reflux and that is what he takes at home





Review of Systems


Gastrointestinal:  Nausea


Musculoskeletal:  leg pain





Objective


Exam


Vital Signs





Vital Signs








  Date Time  Temp Pulse Resp B/P (MAP) Pulse Ox O2 Delivery O2 Flow Rate FiO2


 


18 15:14 98.9 76 18 127/61 (83) 95 Room Air  





Capillary Refill : Less Than 3 Seconds


General Appearance:  No Apparent Distress, WD/WN


Respiratory:  Chest Non Tender, Lungs Clear, Normal Breath Sounds, No Accessory 

Muscle Use, No Respiratory Distress


Cardiovascular:  Regular Rate, Rhythm, No Edema, No Gallop, No JVD, Normal 

Peripheral Pulses, Systolic Murmur


Neurologic/Psychiatric:  Alert, Oriented x3, No Motor/Sensory Deficits, Normal 

Mood/Affect





Results/Procedures


Lab


Laboratory Tests


18 05:39








Patient resulted labs reviewed.





Assessment/Plan


Assessment and Plan


Assess & Plan/Chief Complaint


Assessment:


Right quadriceps tendon disruption with medial and lateral meniscus tears 

prohibiting ambulation


COPD


Cardiac murmur


Depression


Suicidal statements now he denies he stated it


GERD





Plan:


Inpatient rehabilitation protocol


Ambulate as much as possible


Appreciate orthopedics


TUMS





Diagnosis/Problems


Diagnosis/Problems





(1) Unspecified injury of right quadriceps muscle, fascia and tendon, initial 

encounter


Status:  Acute


(2) COPD (chronic obstructive pulmonary disease)


Status:  Chronic


Qualifiers:  


   COPD type:  unspecified COPD  Qualified Codes:  J44.9 - Chronic obstructive 

pulmonary disease, unspecified


(3) Cardiac murmur


Status:  Chronic


(4) GERD (gastroesophageal reflux disease)


Status:  Chronic


Qualifiers:  


   Esophagitis presence:  without esophagitis  Qualified Codes:  K21.9 - Gastro-

esophageal reflux disease without esophagitis





Clinical Quality Measures


DVT/VTE Risk/Contraindication:


Risk Factor Score Per Nursin


RFS Level Per Nursing on Admit:  4+=Very High











REED EGAN DO Dec 30, 2018 13:05

## 2018-12-31 VITALS — DIASTOLIC BLOOD PRESSURE: 72 MMHG | SYSTOLIC BLOOD PRESSURE: 129 MMHG

## 2018-12-31 VITALS — DIASTOLIC BLOOD PRESSURE: 72 MMHG | SYSTOLIC BLOOD PRESSURE: 122 MMHG

## 2018-12-31 LAB
APTT PPP: YELLOW S
BACTERIA #/AREA URNS HPF: (no result) /HPF
BILIRUB UR QL STRIP: NEGATIVE
FIBRINOGEN PPP-MCNC: (no result) MG/DL
GLUCOSE UR STRIP-MCNC: NEGATIVE MG/DL
KETONES UR QL STRIP: NEGATIVE
LEUKOCYTE ESTERASE UR QL STRIP: (no result)
NITRITE UR QL STRIP: POSITIVE
PH UR STRIP: 5 [PH] (ref 5–9)
PROT UR QL STRIP: NEGATIVE
RBC #/AREA URNS HPF: (no result) /HPF
SP GR UR STRIP: 1.02 (ref 1.02–1.02)
UROBILINOGEN UR-MCNC: NORMAL MG/DL
WBC #/AREA URNS HPF: (no result) /HPF

## 2018-12-31 RX ADMIN — FLUTICASONE PROPIONATE AND SALMETEROL XINAFOATE SCH PUFF: 115; 21 AEROSOL, METERED RESPIRATORY (INHALATION) at 09:12

## 2018-12-31 RX ADMIN — ENOXAPARIN SODIUM SCH MG: 100 INJECTION SUBCUTANEOUS at 14:56

## 2018-12-31 RX ADMIN — OXYCODONE HYDROCHLORIDE AND ACETAMINOPHEN PRN TAB: 5; 325 TABLET ORAL at 22:30

## 2018-12-31 NOTE — PROGRESS NOTE-HOSPITALIST
Subjective


HPI/CC On Admission


Date Seen by Provider:  Dec 31, 2018


Time Seen by Provider:  13:00


Subjective/Events-last exam


Patient tolerating physical therapy


Pain is controlled


Denies any shortness of breath


Using Tums as needed





Review of Systems


General:  Malaise


Musculoskeletal:  leg pain





Objective


Exam


Vital Signs





Vital Signs








  Date Time  Temp Pulse Resp B/P (MAP) Pulse Ox O2 Delivery O2 Flow Rate FiO2


 


18 09:12     96 Room Air  


 


18 05:19 98.0 87 18 122/72 (89)    





Capillary Refill : Less Than 3 Seconds


General Appearance:  No Apparent Distress, WD/WN, Chronically ill


Respiratory:  Chest Non Tender, Lungs Clear, Normal Breath Sounds, No Accessory 

Muscle Use, No Respiratory Distress


Cardiovascular:  Regular Rate, Rhythm, No Edema, No Gallop, No JVD, No Murmur, 

Normal Peripheral Pulses


Neurologic/Psychiatric:  Alert, Oriented x3, No Motor/Sensory Deficits, Normal 

Mood/Affect





Results/Procedures


Lab


Patient resulted labs reviewed.





Assessment/Plan


Assessment and Plan


Assess & Plan/Chief Complaint


Assessment:


Right quadriceps tendon disruption with medial and lateral meniscus tears 

prohibiting ambulation


COPD


Cardiac murmur


Depression


Suicidal statements now he denies he stated it


GERD





Plan:


Inpatient rehabilitation protocol


Ambulate as much as possible


Appreciate orthopedics


TUMS





Diagnosis/Problems


Diagnosis/Problems





(1) Unspecified injury of right quadriceps muscle, fascia and tendon, initial 

encounter


Status:  Acute


(2) COPD (chronic obstructive pulmonary disease)


Status:  Chronic


Qualifiers:  


   COPD type:  unspecified COPD  Qualified Codes:  J44.9 - Chronic obstructive 

pulmonary disease, unspecified


(3) Cardiac murmur


Status:  Chronic


(4) GERD (gastroesophageal reflux disease)


Status:  Chronic


Qualifiers:  


   Esophagitis presence:  without esophagitis  Qualified Codes:  K21.9 - Gastro-

esophageal reflux disease without esophagitis





Clinical Quality Measures


DVT/VTE Risk/Contraindication:


Risk Factor Score Per Nursin


RFS Level Per Nursing on Admit:  4+=Very High











REED EGAN DO Dec 31, 2018 13:16

## 2018-12-31 NOTE — PM&R PROGRESS NOTE
Subjective


This was a face to face visit with the patient.


Date Seen by Provider:  Dec 31, 2018


Time Seen by Provider:  07:35


Subjective/Events-last exam


Patient was seen in his room this AM Patient SBA for transfers Appears less 

confused Incision site rt knee covered with island dressing clean and 

dry.Appreciate ST/OT/PT notes





Objective


Physician Exam


Last Set of Vital Signs





Vital Signs








  Date Time  Temp Pulse Resp B/P (MAP) Pulse Ox O2 Delivery O2 Flow Rate FiO2


 


12/31/18 05:19 98.0 87 18 122/72 (89) 95 Room Air  





Capillary Refill : Less Than 3 Seconds


I&O











Intake and Output 


 


 12/31/18





 00:00


 


Intake Total 1635 ml


 


Output Total 650 ml


 


Balance 985 ml


 


 


 


Intake Oral 1635 ml


 


Output Urine Total 650 ml


 


# Voids 8


 


# Bowel Movements 1








General:  Alert, Cooperative, No Acute Distress


HEENT:  Atraumatic, PERRLA, EOMI, Mucous Memb Moist/Pink


Neck:  Supple, No JVD


Lungs:  Clear to Auscultation


Heart:  Regular Rate


Abdomen:  Normal Bowel Sounds, Soft, No Tenderness


Extremities:  Other (rt knee brace)


Neuro:  Other (strength impaired s/p rt Quad repair)





Results


Lab Data


Laboratory Tests


12/30/18 05:39: 


White Blood Count 6.1, Red Blood Count 3.59L, Hemoglobin 11.9L, Hematocrit 36L, 

Mean Corpuscular Volume 101H, Mean Corpuscular Hemoglobin 33, Mean Corpuscular 

Hemoglobin Concent 33, Red Cell Distribution Width 13.3, Platelet Count 338, 

Mean Platelet Volume 8.8, Neutrophils (%) (Auto) 62, Lymphocytes (%) (Auto) 26, 

Monocytes (%) (Auto) 9, Eosinophils (%) (Auto) 2, Basophils (%) (Auto) 1, 

Neutrophils # (Auto) 3.8, Lymphocytes # (Auto) 1.6, Monocytes # (Auto) 0.6, 

Eosinophils # (Auto) 0.1, Basophils # (Auto) 0.0, Sodium Level 139, Potassium 

Level 4.2, Chloride Level 110H, Carbon Dioxide Level 18L, Anion Gap 11, Blood 

Urea Nitrogen 16, Creatinine 0.92, Estimat Glomerular Filtration Rate > 60, BUN/

Creatinine Ratio 17, Glucose Level 115H, Calcium Level 9.1, Corrected Calcium 

9.4, Total Bilirubin 0.4, Aspartate Amino Transf (AST/SGOT) 28, Alanine 

Aminotransferase (ALT/SGPT) 48, Alkaline Phosphatase 72, Total Protein 6.8, 

Albumin 3.6





Assessment/Plan


Assessment and Plan


Fall with rt Quadriceps tear s/p repair DR Cottrell


DVT Prophylaxis Lovenox subcut


Mild cognitive deficits


Plan Continue PT/OT


Team Conference 1-2-19


Last day for me here


Hospitalist service to assume care as per Administrators request


Co-Morbidities that are continuing to impact the rehab process: (include details

)











JED BEAR MD Dec 31, 2018 08:17

## 2018-12-31 NOTE — THERAPY GROUP DAILY NOTE
Therapy Daily Group Note


Patient Education Topic


Exercises, Other List Below (car transfer)





Exercises


LE Seated Exercise, Stretching, UE Exercise





Other/Notes


Pt. participated in PT group session this date.   Pt. ambulated to/from with 

CGA and encouragement to participate.  Pts. introduced themselves and shared 

their New Years Resolutions.  Pts. were distributed written illustrated cards 

with seated U&L extremity exercises on them and each patient read and 

demonstrated the exercises they were given while leading the others in 

exercise.  The car simulator device was brought to the group.  Safe car TRF was 

demonstrated with emphasis on sitting on car seat with both feet on ground when 

getting in/out as well as possibly sitting on a cushion in the car seat if 

needed for height and scooting seat to full back position to enter and exit 

car.  Devices that attach to a car and allow for easier exit were discussed.  

Pt. to room after group with bell at hand, needs met.


Pt benefitted from the group setting as it provided discussion amongst all 

members regarding barriers of recent hospitalization and how they dealt with it.


Start Time:  13:00


Stop Time:  14:00


Total Billed Treatment Time:  60


Total Billed Treatment


visit


GRP 60











TAPAN GA PT Dec 31, 2018 15:10

## 2018-12-31 NOTE — PHYSICAL THERAPY DAILY NOTE
PT Daily Note-Current


Subjective


Patient in hip chair pre tx, agrees to PT, no complaints of pain.





Appearance


Patient in hip chair post tx with nurse call, phone, tray, all needs met, chair 

alarm on.





Mental Status


Patient Orientation:  Person, Place, Situation


right knee immobilizer





Transfers


Therapy Code Descriptions/Definitions 





Functional [place] Measure:


0=Not Assessed/NA        4=Minimal Assistance


1=Total Assistance        5=Supervision or Setup


2=Maximal Assistance  6=Modified [place]


3=Moderate Assistance 7=Complete [place]








Therapy Quality Codes:


6    Independent with activity with or without an assistive device


5    Patient requires set up or clean up by helper.  Patient completes activity

  by  themselves


4    Supervision or touching assist (CGA). [place] provide cues , steadying 

assist


3    The helper provides less than half the effort to complete the activity


2    The helper provides more than half the effort to complete the activity


1    Dependent.  The helper does all the effort to complete an activity 


7    Patient refused to complete or attempt activity


9    The patient did not perform the activity before the current illness or 

injury


88  Not attempted due to Medical conditions or safety concerns


Transfers (B, C, W/C) (FIM):  5


Scootin


Rollin


Roll Left to Right (QC):  6


Supine to/from Sit:  5


Sit to/from Stand:  6


Sit to Lying (QC):  4


Sit to Stand (QC):  6


Chair/Bed-to-Chair Xfer(QC):  6


Bed to/from Chair:  6


Car Transfer (QC):  6


Patient performs bed mobility with mod I but needs SBA for supine <-> sit, 

transfers with mod I, sit to stand mod I, car transfer with mod I.  Patient's 

right leg is too long with the immobilizer on to get into the car transfer 

machine.  He says he will leave this facility in a [employer] explorer so he should 

have enough leg space to get his right leg into it.





Weight Bearing


Right Lower Extremity:  Right


Full Weight Bearing


Left Lower Extremity:  Left


Full Weight Bearing





Gait Training


Gait (FIM):  6


Distance:  400', 200'


Walk 10 feet (QC):  6


Walk 150 ft (QC):  6


Walking 10ft/uneven surface-QC:  6


Gait Level of Assist:  6


Gait Assistive Device:  FWW


Slow ambulation, no LOB or unsteadiness.  Patient can ambulate 400' with a 

rolling walker with mod I (including 50' with at least 2 turns of 90 degrees 

and 10' over an uneven surface)





Wheelchair Training


Does the Pt Use a Wheelchair?:  No





Stair Training


 Stair Training: Handrails/:  2 handrails


Stairs (FIM):  2


#of Steps:  4


1 Step (curb) (QC):  4


4 Steps (QC):  4


Stairs:  Pattern:  Step to


Level of Assist:  4


Patient can go up and down 4 steps using 2 handrails with CGA.  Cues for step 

placement.





Exercises


LAQ left side for 5 min





Treatments


bed mobility, transfers, car transfer, stairs, ambulation





Assessment


Current Status:  Fair Progress


improved general mobility, patient requires some effort for supine <-> sit but 

he can do it without assist





PT Long Term Goals


Long Term Goals


PT Long Term Goals Time Frame:  2019


Transfers (B,C,W/C) (FIM):  5


Sit to Lying (QC):  5


Lying-Sitting on Side/Bed(QC):  5


Sit to Stand (QC):  5


Rollin


Roll Left to Right (QC):  5


Chair/Bed-to-Chair Xfer(QC):  5


Car Transfer (QC):  5


Does the Patient Walk:  Yes


Gait (FIM):  5


Gait distance (FIM):  3=150 ft


Distance:  150'


Walk 10 feet (QC):  5


Walk 10ft-Uneven Surface(QC):  5


Walk 50ft with 2 Turns (QC):  5


Walk 150 ft (QC):  5


Gait Level of Assist:  5


Gait Assistive Device:  FWW


Stairs (FIM):  5


# of Steps:  6


1 Step (curb) (QC):  5


4 Steps (QC):  5


12 Steps (QC):  88


Stairs Level Of Assist:  5


Picking up an Object (QC):  5





PT Plan


Problem List


Problem List:  Activity Tolerance, Functional Strength, Safety, Balance, Gait, 

Transfer, Bed Mobility, ROM





Treatment/Plan


Treatment Plan:  Continue Plan of Care


Treatment Plan:  Bed Mobility, Education, Functional Activity Deepti, Functional 

Strength, Group Therapy, Gait, Safety, Therapeutic Exercise, Transfers


Treatment Duration:  2019


Frequency:  At least 5 of 7 days/Wk (IRF)


Estimated Hrs Per Day:  1.5 hours per day


Patient and/or Family Agrees t:  Yes





Safety Risks/Education


Patient Education:  Gait Training, Transfer Techniques, Steps, Correct 

Positioning, Safety Issues


Teaching Recipient:  Patient


Teaching Methods:  Demonstration, Discussion


Response to Teaching:  Reinforcement Needed





Time/GCodes


Time In:  1000


Time Out:  1055


Total Billed Treatment Time:  55


Total Billed Treatment


1 visit


GT 30'


FA 25'











CHIO HAYWARD PT Dec 31, 2018 10:49

## 2018-12-31 NOTE — SPEECH THERAPY DAILY NOTE
Speech Daily Progress Note


Subjective


Date Seen by Provider:  Dec 31, 2018


Time Seen by Provider:  00:30


Patient was resting in his bed when I entered the room.





Objective


Patient completed memory tasks related to self at 90% without cuing.





Assessment


Assessment Current Status:  Good Progress





Treatment Plan


Continue Plan of Care





Communication


Comprehension:  2


Expression:  3





Social Cognition


Social Interaction:  3


Problem Solvin


Memory:  2





Speech Short Term Goals


Short Term Goals


Short Term Goals


1) Patient will recall information/procedures related to personal safety with 80

% or greater accuracy.


2) Patient will demo follow through with 1-2 step directions with 80% or 

greater accuracy.


3) Patient will complete memory tasks with 80% or greater accuracy.





Speech Long Term Goals


Long Term Goals


Patient will improve safety awareness and independence for personal needs in 

order to return home safely.





Speech-Plan


Patient/Family Goals


Patient/Family Goals:  


Patient is planning to return home with wife this week.





Treatment Plan


Speech Therapy Treatment Plan:  Continue Plan of Care


Patient has made good progress with skilled ST services.


Treatment Duration:  Dec 31, 2018


Frequency:  5 times per week


Estimated Hrs Per Day:  .5 hour per day


Rehab Potential:  Fair


Barriers to Learning:  


Patient has memory deficits.


Pt/Family Agrees to Plan:  Yes





Safety Risks/Education


Teaching Recipient:  Patient


Teaching Methods:  Discussion


Response to Teaching:  Verbalize Understanding


Education Topics Provided:  


Safety





Time


Speech Therapy Time In:  11:00


Speech Therapy Time Out:  11:30


Total Billed Time:  30


Billed Treatment Time


[address] Dec 31, 2018 11:32

## 2018-12-31 NOTE — OCCUPATIONAL THER DAILY NOTE
OT Current Status-Daily Note


Subjective


Pt seen in room, up in bed, agreeable to OT. No pain mentioned.





Appearance


Alert, cooperative





Mental Status/Objective


Therapy Code Descriptions/Definitions 





Functional [place] Measure:


0=Not Assessed/NA        4=Minimal Assistance


1=Total Assistance        5=Supervision or Setup


2=Maximal Assistance  6=Modified [place]


3=Moderate Assistance 7=Complete [place]





ADL-Treatment


Pt declined bathing or changing clothes. Able to assist putting leg brace on 

but not able to do it himself. Supine to sit EOB without help except to raise 

bed up. Able to get shorts on over feet setup, pull them up to knees, sit to 

stand SBA, FWW and pull pant up over hips and snap them. Walked to bathroom SBA

, FWW and stood at toilet to urinate, no assist, with grab bars and FWW in 

front.


Therapy Code Descriptions/Definitions 





Functional [place] Measure:


0=Not Assessed/NA        4=Minimal Assistance


1=Total Assistance        5=Supervision or Setup


2=Maximal Assistance  6=Modified [place]


3=Moderate Assistance 7=Complete [place]








Therapy Quality Codes:


6    Independent with activity with or without an assistive device


5    Patient requires set up or clean up by helper.  Patient completes activity

  by  themselves


4    Supervision or touching assist (CGA). [place] provide cues , steadying 

assist


3    The helper provides less than half the effort to complete the activity


2    The helper provides more than half the effort to complete the activity


1    Dependent.  The helper does all the effort to complete an activity 


7    Patient refused to complete or attempt activity


9    The patient did not perform the activity before the current illness or 

injury


88  Not attempted due to Medical conditions or safety concerns


Lower Body Dressing (FIM):  5 (SBA putting shorts on, pulling them up, 

fastening pants, FWW)


Toileting (FIM):  6 (Standing at toilet, FWW, grab bar available)





Other Treatment


Pt walked with SBA, FWW to gym, no LOB observed. Able to sit down on tall 

treatment table without help but skilled cues needed for hand placement to get 

up off tall table. He struggled a bit at midpoint and neded use of arms to pull 

up to standing. Pt completed bilat UE ex with 2# weight and with red theraband. 

He did 10-15 reps of the different exercises, sometimes breaking them down to 

small groups of repetitions and recalling several of the exercises on his own. 

To strengthen arms to help with transfers. Pt walked back to room and was left 

up in chair, chair alarm on, all needs met.





Education


OT Patient Education:  Home exercise program, Modified ADL techniques, Progress 

toward Goal/Update tx plan, Purpose of tx/functional activities, Safety issues, 

Transfer techniques


Teaching Recipient:  Patient


Teaching Methods:  Demonstration, Discussion


Response to Teaching:  Verbalize Understanding, Return Demonstration, 

Reinforcement Needed





OT Short Term Goals


Short Term Goals


Time Frame:  Dec 28, 2018


Bathing(FIM):  4


Toileting(FIM):  3


Toilet/Commode Transfer(FIM):  3


Additional Short Term Goals:  1-Demonstrate ADL Tasks, 2-Verbalize Understanding

, 3-ImproveStrength/Deepti


1=Demonstrate adherence to instructed precautions during ADL tasks.


2=Patient will verbalize/demonstrate understanding of assistive devices/

modifications for ADL.


3=Patient will improve strength/tolerance for activity to enable patient to 

perform ADL's.





OT Long Term Goals


Long Term Goals


Time Frame:  2019


Eating (FIM):  6


Eating (QC):  6


Groomin


Oral Hygiene (QC):  6


Bathing(FIM):  5


Shower/Bathe Self (QC):  5


Upper Body Dressing(FIM):  6


Upper Body Dressing (QC):  6


Lower Body Dressing(FIM):  5


Lower Body Dressing (QC):  5


On/Off Footwear (QC):  5


Toileting(FIM):  5


Toileting Hygiene (QC):  5


Toilet/Commode Transfer(FIM):  5


Toilet/Commode Transfer (QC):  5


Shower Transfer(FIM):  5


Additional Goals:  1-Demonstrate ADL Tasks, 2-Verbalize Understanding, 3-

ImproveStrength/Deepti


1=Demonstrate adherence to instructed precautions during ADL tasks.


2=Patient will verbalize/demonstrate understanding of assistive devices/

modifications for ADL.


3=Patient will improve strength/tolerance for activity to enable patient to 

perform ADL's.





OT Education/Plan


Problem List/Assessment


Pt would benefit from skilled OT to increase his independence in basic self 

care.





Discharge Recommendations


Plan/Recommendations:  Continue POC





Treatment Plan/Plan of Care


Patient would benefit from OT for education, treatment and training to promote 

independence in ADL's, mobility, safety and/or upper extremity function for ADL'

s.


Plan of Care:  ADL Retraining, Functional Mobility, Group Exercise/Act as Ind (

education, exercise, socialization, funct mobility, memory), UE Funct Exercise/

Act, UE Neuromus Re-Ed/Coord


Treatment Duration:  2019


Frequency:  At least 5 of 7 days/Wk (IRF)


Estimated Hrs Per Day:  1.5 hours per day (1.25 to 1.5)


Agreement:  Yes


Rehab Potential:  Fair





Time/GCodes


Start Time:  08:15


Stop Time:  09:00


Total Time Billed (hr/min):  5


Billed Treatment Time


visit, 20 minutes ADL, 25 minutes exercise











PARUL KNOWLES OT Dec 31, 2018 12:37

## 2019-01-01 VITALS — SYSTOLIC BLOOD PRESSURE: 105 MMHG | DIASTOLIC BLOOD PRESSURE: 65 MMHG

## 2019-01-01 VITALS — SYSTOLIC BLOOD PRESSURE: 97 MMHG | DIASTOLIC BLOOD PRESSURE: 62 MMHG

## 2019-01-01 RX ADMIN — SULFAMETHOXAZOLE AND TRIMETHOPRIM SCH EA: 800; 160 TABLET ORAL at 17:25

## 2019-01-01 RX ADMIN — OXYCODONE HYDROCHLORIDE AND ACETAMINOPHEN PRN TAB: 5; 325 TABLET ORAL at 08:29

## 2019-01-01 RX ADMIN — FLUTICASONE PROPIONATE AND SALMETEROL XINAFOATE SCH PUFF: 115; 21 AEROSOL, METERED RESPIRATORY (INHALATION) at 07:28

## 2019-01-01 RX ADMIN — ENOXAPARIN SODIUM SCH MG: 100 INJECTION SUBCUTANEOUS at 15:48

## 2019-01-01 NOTE — OCCUPATIONAL THER DAILY NOTE
OT Current Status-Daily Note


Mental Status/Objective


Therapy Code Descriptions/Definitions 





Functional [place] Measure:


0=Not Assessed/NA        4=Minimal Assistance


1=Total Assistance        5=Supervision or Setup


2=Maximal Assistance  6=Modified [place]


3=Moderate Assistance 7=Complete [place]





ADL-Treatment


Therapy Code Descriptions/Definitions 





Functional [place] Measure:


0=Not Assessed/NA        4=Minimal Assistance


1=Total Assistance        5=Supervision or Setup


2=Maximal Assistance  6=Modified [place]


3=Moderate Assistance 7=Complete [place]








Therapy Quality Codes:


6    Independent with activity with or without an assistive device


5    Patient requires set up or clean up by helper.  Patient completes activity

  by  themselves


4    Supervision or touching assist (CGA). [place] provide cues , steadying 

assist


3    The helper provides less than half the effort to complete the activity


2    The helper provides more than half the effort to complete the activity


1    Dependent.  The helper does all the effort to complete an activity 


7    Patient refused to complete or attempt activity


9    The patient did not perform the activity before the current illness or 

injury


88  Not attempted due to Medical conditions or safety concerns





OT Short Term Goals


Short Term Goals


Time Frame:  Dec 28, 2018


Bathing(FIM):  4


Toileting(FIM):  3


Toilet/Commode Transfer(FIM):  3


Additional Short Term Goals:  1-Demonstrate ADL Tasks, 2-Verbalize Understanding

, 3-ImproveStrength/Deepti


1=Demonstrate adherence to instructed precautions during ADL tasks.


2=Patient will verbalize/demonstrate understanding of assistive devices/

modifications for ADL.


3=Patient will improve strength/tolerance for activity to enable patient to 

perform ADL's.





OT Long Term Goals


Long Term Goals


Time Frame:  2019


Eating (FIM):  6


Eating (QC):  6


Groomin


Oral Hygiene (QC):  6


Bathing(FIM):  5


Shower/Bathe Self (QC):  5


Upper Body Dressing(FIM):  6


Upper Body Dressing (QC):  6


Lower Body Dressing(FIM):  5


Lower Body Dressing (QC):  5


On/Off Footwear (QC):  5


Toileting(FIM):  5


Toileting Hygiene (QC):  5


Toilet/Commode Transfer(FIM):  5


Toilet/Commode Transfer (QC):  5


Shower Transfer(FIM):  5


Additional Goals:  1-Demonstrate ADL Tasks, 2-Verbalize Understanding, 3-

ImproveStrength/Deepti


1=Demonstrate adherence to instructed precautions during ADL tasks.


2=Patient will verbalize/demonstrate understanding of assistive devices/

modifications for ADL.


3=Patient will improve strength/tolerance for activity to enable patient to 

perform ADL's.





OT Education/Plan


Problem List/Assessment


Pt would benefit from skilled OT to increase his independence in basic self 

care.





Discharge Recommendations


Plan/Recommendations:  Continue POC





Treatment Plan/Plan of Care


Patient would benefit from OT for education, treatment and training to promote 

independence in ADL's, mobility, safety and/or upper extremity function for ADL'

s.


Plan of Care:  ADL Retraining, Functional Mobility, Group Exercise/Act as Ind (

education, exercise, socialization, funct mobility, memory), UE Funct Exercise/

Act, UE Neuromus Re-Ed/Coord


Treatment Duration:  2019


Frequency:  At least 5 of 7 days/Wk (IRF)


Estimated Hrs Per Day:  1.5 hours per day (1.25 to 1.5)


Agreement:  Yes


Rehab Potential:  Fair





Time/GCodes


Start Time:  08:00


Stop Time:  09:00


Total Time Billed (hr/min):  60


Billed Treatment Time


visit, 35 minutes ADL, 25 minutes exercise











PARUL KNOWLES OT 2019 10:03

## 2019-01-01 NOTE — PM&R PROGRESS NOTE
Subjective


This was a face to face visit with the patient.


Date Seen by Provider:  Jan 1, 2019


Time Seen by Provider:  10:00


Subjective/Events-last exam


Patient was seen in his room in bed.


Reviewed urine test results and placed on Bactrim twice daily for TNTC white 

blood cells in urine


He reports UTIs on occasion


Dysuria continues but it's not terrible


Pain is improved in the leg


Bowels are moving


Will DC tele-sitter since no suicidal ideation reported


Wants to go home soon


Review of Systems


Genitourinary:  Dysuria, Frequency


Musculoskeletal:  leg pain





Objective


Physician Exam


Last Set of Vital Signs





Vital Signs








  Date Time  Temp Pulse Resp B/P (MAP) Pulse Ox O2 Delivery O2 Flow Rate FiO2


 


1/1/19 09:00      Room Air  


 


1/1/19 07:28     96   


 


1/1/19 05:07 97.2 74 16 105/65 (78)    





Capillary Refill : Less Than 3 Seconds


I&O











Intake and Output 


 


 1/1/19





 00:00


 


Intake Total 1280 ml


 


Output Total 400 ml


 


Balance 880 ml


 


 


 


Intake Oral 1280 ml


 


Output Urine Total 400 ml


 


# Voids 7


 


# Bowel Movements 1








General:  Alert, Cooperative, No Acute Distress


HEENT:  Atraumatic, PERRLA, EOMI, Mucous Memb Moist/Pink


Neck:  Supple, No JVD


Lungs:  Clear to Auscultation


Heart:  Regular Rate, Other (Systolic murmur)


Abdomen:  Normal Bowel Sounds, Soft, No Tenderness


Extremities:  Other (rt knee brace)


Neuro:  Normal Speech, Strength at 5/5 X4 Ext, Other (strength impaired s/p rt 

Quad repair)


Psych/Mental Status:  Mental Status NL





Results


Lab Data


Laboratory Tests


12/30/18 05:39: 


White Blood Count 6.1, Red Blood Count 3.59L, Hemoglobin 11.9L, Hematocrit 36L, 

Mean Corpuscular Volume 101H, Mean Corpuscular Hemoglobin 33, Mean Corpuscular 

Hemoglobin Concent 33, Red Cell Distribution Width 13.3, Platelet Count 338, 

Mean Platelet Volume 8.8, Neutrophils (%) (Auto) 62, Lymphocytes (%) (Auto) 26, 

Monocytes (%) (Auto) 9, Eosinophils (%) (Auto) 2, Basophils (%) (Auto) 1, 

Neutrophils # (Auto) 3.8, Lymphocytes # (Auto) 1.6, Monocytes # (Auto) 0.6, 

Eosinophils # (Auto) 0.1, Basophils # (Auto) 0.0, Sodium Level 139, Potassium 

Level 4.2, Chloride Level 110H, Carbon Dioxide Level 18L, Anion Gap 11, Blood 

Urea Nitrogen 16, Creatinine 0.92, Estimat Glomerular Filtration Rate > 60, BUN/

Creatinine Ratio 17, Glucose Level 115H, Calcium Level 9.1, Corrected Calcium 

9.4, Total Bilirubin 0.4, Aspartate Amino Transf (AST/SGOT) 28, Alanine 

Aminotransferase (ALT/SGPT) 48, Alkaline Phosphatase 72, Total Protein 6.8, 

Albumin 3.6


12/31/18 20:00: 


Urine Color YELLOW, Urine Clarity SLIGHTLY CLOUDY, Urine pH 5, Urine Specific 

Gravity 1.020, Urine Protein NEGATIVE, Urine Glucose (UA) NEGATIVE, Urine 

Ketones NEGATIVE, Urine Nitrite POSITIVEH, Urine Bilirubin NEGATIVE, Urine 

Urobilinogen NORMAL, Urine Leukocyte Esterase 3+H, Urine RBC (Auto) 1+H, Urine 

RBC 0-2, Urine WBC TNTCH, Urine Crystals NONE, Urine Bacteria LARGEH, Urine 

Casts NONE, Urine Mucus NEGATIVE, Urine Culture Indicated YES





Current Funtional Status


Await urine culture for UTI which appears to be a recurrent problem periodically


Patient participating in therapy


Sent for discharge on 1/30/19


To [facility]


Leg pain is improved


Reviewed physical therapy and occupational therapy and speech therapy 

evaluation and management





Progress Toward Rehab Goals


Discharge home set for 1/3/19 and will need home health physical therapy





Assessment/Plan


Assessment and Plan





(1) Unspecified injury of right quadriceps muscle, fascia and tendon, initial 

encounter


Status:  Acute


(2) COPD (chronic obstructive pulmonary disease)


Qualifiers:  


   Qualified Codes:  J44.9 - Chronic obstructive pulmonary disease, unspecified


Status:  Chronic


(3) Cardiac murmur


Status:  Chronic


(4) GERD (gastroesophageal reflux disease)


Qualifiers:  


   Qualified Codes:  K21.9 - Gastro-esophageal reflux disease without 

esophagitis


Status:  Chronic


(5) UTI (urinary tract infection)


Qualifiers:  


   Qualified Codes:  N30.00 - Acute cystitis without hematuria


(6) Dysuria


Status:  Acute


Co-Morbidities that are continuing to impact the rehab process: (include details

)











REED EGAN DO Jan 1, 2019 12:12

## 2019-01-01 NOTE — OCCUPATIONAL THER DAILY NOTE
OT Current Status-Daily Note


Subjective


Pt seen in room, up in bed, agreeable to OT. No pain mentioned





Appearance


Alert, cooperative





Mental Status/Objective


Patient Orientation:  Person, Place


Therapy Code Descriptions/Definitions 





Functional [place] Measure:


0=Not Assessed/NA        4=Minimal Assistance


1=Total Assistance        5=Supervision or Setup


2=Maximal Assistance  6=Modified [place]


3=Moderate Assistance 7=Complete [place]





ADL-Treatment


Pt pleased that discharge is planned for Thursday but can't remember the date 

and repeatedly thought it was tomorrow. Undressed lower body in bed and 

completed sponge bath (declined to take brace off and wash R leg), rolling side 

to side to wash bottom ("I don't think I can do that'). Help to wash back. 

Donned clean underwear while in bed and pants while sitting at edge of bed. 

Edge of bed appeared easier but pt wasn't able to differentiate. Used dressing 

stick to get pants over R foot. Sit to stand SBA, FWW and he was able to pull 

pants up and fasten them. Declined to change socks. Doffed/donned t-shirt with 

setup. Washed face and hands with setup, declined to comb his hair, stated that 

he had already cleaned his dentures.


Therapy Code Descriptions/Definitions 





Functional [place] Measure:


0=Not Assessed/NA        4=Minimal Assistance


1=Total Assistance        5=Supervision or Setup


2=Maximal Assistance  6=Modified [place]


3=Moderate Assistance 7=Complete [place]








Therapy Quality Codes:


6    Independent with activity with or without an assistive device


5    Patient requires set up or clean up by helper.  Patient completes activity

  by  themselves


4    Supervision or touching assist (CGA). [place] provide cues , steadying 

assist


3    The helper provides less than half the effort to complete the activity


2    The helper provides more than half the effort to complete the activity


1    Dependent.  The helper does all the effort to complete an activity 


7    Patient refused to complete or attempt activity


9    The patient did not perform the activity before the current illness or 

injury


88  Not attempted due to Medical conditions or safety concerns


Grooming (FIM):  5 (setup)


Bathing (FIM):  5 (setup, skilled cues )


Upper Body (FIM):  5 (setup)


Lower Body Dressing (FIM):  5 (setup, SBA for standing, skilled cues. Dressing 

stick)





Other Treatment


Pt walked with SBA, FWW to gym, cues to push up from bed and from tx table. Pt 

did bilat UE exercise with 2# weight and red theraband, recalling several that 

he had learned and trying alternate ways to do others. He was able to do 

generally 15-20 reps of each exercise. Straps adjusted on leg splint to help 

keep it from sliding down when he walks. Pt walked back to room, SBA, FWW and 

was left up in bed, 4 rails up, bed alarm on, all needs met.





Education


OT Patient Education:  Exercise program, Progress toward Goal/Update tx plan, 

Purpose of tx/functional activities, Safety issues, Transfer techniques


Teaching Recipient:  Patient


Teaching Methods:  Demonstration, Discussion


Response to Teaching:  Verbalize Understanding, Return Demonstration, 

Reinforcement Needed





OT Short Term Goals


Short Term Goals


Time Frame:  Dec 28, 2018


Bathing(FIM):  4


Toileting(FIM):  3


Toilet/Commode Transfer(FIM):  3


Additional Short Term Goals:  1-Demonstrate ADL Tasks, 2-Verbalize Understanding

, 3-ImproveStrength/Deepti


1=Demonstrate adherence to instructed precautions during ADL tasks.


2=Patient will verbalize/demonstrate understanding of assistive devices/

modifications for ADL.


3=Patient will improve strength/tolerance for activity to enable patient to 

perform ADL's.





OT Long Term Goals


Long Term Goals


Time Frame:  2019


Eating (FIM):  6


Eating (QC):  6


Groomin


Oral Hygiene (QC):  6


Bathing(FIM):  5


Shower/Bathe Self (QC):  5


Upper Body Dressing(FIM):  6


Upper Body Dressing (QC):  6


Lower Body Dressing(FIM):  5


Lower Body Dressing (QC):  5


On/Off Footwear (QC):  5


Toileting(FIM):  5


Toileting Hygiene (QC):  5


Toilet/Commode Transfer(FIM):  5


Toilet/Commode Transfer (QC):  5


Shower Transfer(FIM):  5


Additional Goals:  1-Demonstrate ADL Tasks, 2-Verbalize Understanding, 3-

ImproveStrength/Deepti


1=Demonstrate adherence to instructed precautions during ADL tasks.


2=Patient will verbalize/demonstrate understanding of assistive devices/

modifications for ADL.


3=Patient will improve strength/tolerance for activity to enable patient to 

perform ADL's.





OT Education/Plan


Problem List/Assessment


Pt would benefit from skilled OT to increase his independence in basic self 

care.





Discharge Recommendations


Plan/Recommendations:  Continue POC





Treatment Plan/Plan of Care


Patient would benefit from OT for education, treatment and training to promote 

independence in ADL's, mobility, safety and/or upper extremity function for ADL'

s.


Plan of Care:  ADL Retraining, Functional Mobility, Group Exercise/Act as Ind (

education, exercise, socialization, funct mobility, memory), UE Funct Exercise/

Act, UE Neuromus Re-Ed/Coord


Treatment Duration:  2019


Frequency:  At least 5 of 7 days/Wk (IRF)


Estimated Hrs Per Day:  1.5 hours per day (1.25 to 1.5)


Agreement:  Yes


Rehab Potential:  Fair





Time/GCodes


Start Time:  08:00


Stop Time:  09:00


Total Time Billed (hr/min):  60


Billed Treatment Time


visit, 35 minutes ADL, 25 minutes exercise











PARUL KNOWLES OT 2019 12:18

## 2019-01-01 NOTE — PHYSICAL THERAPY DAILY NOTE
PT Daily Note-Current


Subjective


Pt agreeable.  Pt denies pain in (R) knee but says he (L) knee is hurting.  Pt 

rates (L) knee pain 4/10 during activity and 0/10 at rest.





Mental Status


Patient Orientation:  Person, Place, Situation





Transfers


Therapy Code Descriptions/Definitions 





Functional [place] Measure:


0=Not Assessed/NA        4=Minimal Assistance


1=Total Assistance        5=Supervision or Setup


2=Maximal Assistance  6=Modified [place]


3=Moderate Assistance 7=Complete [place]








Therapy Quality Codes:


6    Independent with activity with or without an assistive device


5    Patient requires set up or clean up by helper.  Patient completes activity

  by  themselves


4    Supervision or touching assist (CGA). [place] provide cues , steadying 

assist


3    The helper provides less than half the effort to complete the activity


2    The helper provides more than half the effort to complete the activity


1    Dependent.  The helper does all the effort to complete an activity 


7    Patient refused to complete or attempt activity


9    The patient did not perform the activity before the current illness or 

injury


88  Not attempted due to Medical conditions or safety concerns


Transfers all levels mod (I).





Weight Bearing


Right Lower Extremity:  Right


Full Weight Bearing


Left Lower Extremity:  Left


Full Weight Bearing





Gait Training


Gait Assistive Device:  FWW


Pt amb with FWW and SBA in hallway 800ft total, rest breaks as needed.  Pt 

speed is slow and steady, no LOB.





Exercises


NuStep Minutes:  10


 NuStep Workload:  1





Treatments


Pt seen for well leg ther ex x 20 each:  AP, QS, heel slide, hip abd, SLR and 

SAQ.  Pt operated Nu-step with (R) LE elevated on stool, brace on throughout 

treatment.  Pt did perform (R) LE supine AP x 20, standing in //bars:  hip 

flexion, hip abd, and hip ext x 20 each





Assessment


Current Status:  Good Progress


Pt antione very well.  Pt back to bed with call light, ambu alarm activated and all 

needs met.





PT Long Term Goals


Long Term Goals


PT Long Term Goals Time Frame:  2019


Transfers (B,C,W/C) (FIM):  5


Sit to Lying (QC):  5


Lying-Sitting on Side/Bed(QC):  5


Sit to Stand (QC):  5


Rollin


Roll Left to Right (QC):  5


Chair/Bed-to-Chair Xfer(QC):  5


Car Transfer (QC):  5


Does the Patient Walk:  Yes


Gait (FIM):  5


Gait distance (FIM):  3=150 ft


Distance:  150'


Walk 10 feet (QC):  5


Walk 10ft-Uneven Surface(QC):  5


Walk 50ft with 2 Turns (QC):  5


Walk 150 ft (QC):  5


Gait Level of Assist:  5


Gait Assistive Device:  FWW


Stairs (FIM):  5


# of Steps:  6


1 Step (curb) (QC):  5


4 Steps (QC):  5


12 Steps (QC):  88


Stairs Level Of Assist:  5


Picking up an Object (QC):  5





PT Plan


Treatment/Plan


Treatment Plan:  Continue Plan of Care


Treatment Plan:  Bed Mobility, Education, Functional Activity Deepti, Functional 

Strength, Group Therapy, Gait, Safety, Therapeutic Exercise, Transfers


Treatment Duration:  2019


Frequency:  At least 5 of 7 days/Wk (IRF)


Estimated Hrs Per Day:  1.5 hours per day


Patient and/or Family Agrees t:  Yes





Time/GCodes


Time In:  1000


Time Out:  1100


Total Billed Treatment Time:  60


Total Billed Treatment


1, gait 30min, ther ex 30 min











ANGE MATHIAS CPTA 2019 12:32

## 2019-01-01 NOTE — THERAPY GROUP DAILY NOTE
Therapy Daily Group Note


Patient Education Topic


Home Safety, Fall Prevention





Other/Notes


Pt was an active participant in OT/PT group. He interacted appropriately with 

others in the group and introduced himself by talking about how he got here (

safety awareness). He contributed to education/discussion on home safety and 

fall prevention and was able to verbalize ways that he could plan to be safer 

when he goes home this week. He walked back to his room SBA, FWW and was left 

up in recliner, all needs met.


Start Time:  13:00


Stop Time:  14:00


Total Billed Treatment Time:  60


Total Billed Treatment


visit, 60 minutes group











PARUL KNOWLES OT Jan 1, 2019 14:18

## 2019-01-02 VITALS — SYSTOLIC BLOOD PRESSURE: 122 MMHG | DIASTOLIC BLOOD PRESSURE: 71 MMHG

## 2019-01-02 VITALS — SYSTOLIC BLOOD PRESSURE: 124 MMHG | DIASTOLIC BLOOD PRESSURE: 72 MMHG

## 2019-01-02 RX ADMIN — SULFAMETHOXAZOLE AND TRIMETHOPRIM SCH EA: 800; 160 TABLET ORAL at 05:46

## 2019-01-02 RX ADMIN — FLUTICASONE PROPIONATE AND SALMETEROL XINAFOATE SCH PUFF: 115; 21 AEROSOL, METERED RESPIRATORY (INHALATION) at 11:35

## 2019-01-02 RX ADMIN — ENOXAPARIN SODIUM SCH MG: 100 INJECTION SUBCUTANEOUS at 14:49

## 2019-01-02 RX ADMIN — SULFAMETHOXAZOLE AND TRIMETHOPRIM SCH EA: 800; 160 TABLET ORAL at 17:23

## 2019-01-02 NOTE — OCCUPATIONAL THER DAILY NOTE
OT Current Status-Daily Note


Subjective


Pt seen in room, up in bed, agreeable to OT. No pain mentioned.





Appearance


Alert, cooperative





Mental Status/Objective


Patient Orientation:  Person, Place


Therapy Code Descriptions/Definitions 





Functional [place] Measure:


0=Not Assessed/NA        4=Minimal Assistance


1=Total Assistance        5=Supervision or Setup


2=Maximal Assistance  6=Modified [place]


3=Moderate Assistance 7=Complete [place]





ADL-Treatment


Pt still thinks he might be going home today. Pt got up from supine to sit EOB 

without help. Sit to stand with SBA, FWW, bed raised up. Still struggles with 

hand placement and pushing up to stand. Walked SBA, FWW to bathroom and 

transferred onto tall toilet SBA, using grab bars. Managed clothing and hygiene 

with encouragement to do it himself. Supervision for safety. Stood up SBA and 

walked SBA, FWW to shower chair. Transferred to shower chair with SBA, grab bar 

(same position as toilet). Pt washed and dried all parts, using shower chair, 

grab bars, hand held shower, long handled sponge. Needed supervision so that he 

didn't bend R knee or stand without brace on. Occasional cues for steps such as 

to wash hair and wash feet. Pants on with setup, supervision, SBA when standing

, help with slipper socks. Needed help to put brace on/off but he assisted (

setup). Struggled with sit to stand but was able to do it with CGA, FWW. Walked 

to room and sat EOB. Able to order meals, feed himself (has dentures). Washed 

face and hands setup in shower, put in dentures with setup. Pt left sitting EOB

, bed alarm on, all needs met.


Therapy Code Descriptions/Definitions 





Functional [place] Measure:


0=Not Assessed/NA        4=Minimal Assistance


1=Total Assistance        5=Supervision or Setup


2=Maximal Assistance  6=Modified [place]


3=Moderate Assistance 7=Complete [place]








Therapy Quality Codes:


6    Independent with activity with or without an assistive device


5    Patient requires set up or clean up by helper.  Patient completes activity

  by  themselves


4    Supervision or touching assist (CGA). [place] provide cues , steadying 

assist


3    The helper provides less than half the effort to complete the activity


2    The helper provides more than half the effort to complete the activity


1    Dependent.  The helper does all the effort to complete an activity 


7    Patient refused to complete or attempt activity


9    The patient did not perform the activity before the current illness or 

injury


88  Not attempted due to Medical conditions or safety concerns


Eating (FIM):  6 (Able to feed himself and get a drink. Denures that he has to 

wear)


Eating (QC):  6


Grooming (FIM):  5 (Setup to wash face and hands, put in teeth, comb hair)


Oral Hygiene (QC):  5


Bathing (FIM):  5 (Washed and dried all parts with setup, supervision, SBA. 

Shower bench, grab bars, hand hled shower, long handled sponge)


Shower/Bathe Self (QC):  4


Upper Body (FIM):  5 (setup for shirt off and on)


Upper Body Dressing (QC):  5


Lower Body Dressing (FIM):  4 (Help to put on TORRES hose (setup), help with brace 

(setup), able to get pants on over feet and pull them up with SBA. Help with 

slipper socks off/on (used dressing stick to take them off))


Lower Body Dressing (QC):  4


On/Off Footwear (QC):  3 (Off with dressing stick, on with help)


Toileting (FIM):  5 (SBA for standing, supervision for safety. managed clothing 

and hygiene, tall toilet, grab bars, FWW)


Toileting Hygiene (QC):  4 (supervision)


Toilet/Commode Transfer (FIM):  5 (SBA, supervision. Tall toilet, grab bars, FWW

)


Toilet Transfer (QC):  4 (SBA)


Shower Transfer(FIM):  4 (SBA getting in shower, CGA getting out)





Education


OT Patient Education:  Modified ADL techniques, Progress toward Goal/Update tx 

plan, Purpose of tx/functional activities, Reviewed precautions, Safety issues, 

Transfer techniques, Use of adapted equipment


Teaching Recipient:  Patient


Teaching Methods:  Demonstration, Discussion


Response to Teaching:  Verbalize Understanding, Return Demonstration, 

Reinforcement Needed





OT Short Term Goals


Short Term Goals


Time Frame:  Dec 28, 2018


Bathing(FIM):  4


Toileting(FIM):  3


Toilet/Commode Transfer(FIM):  3


Additional Short Term Goals:  1-Demonstrate ADL Tasks, 2-Verbalize Understanding

, 3-ImproveStrength/Deepti


1=Demonstrate adherence to instructed precautions during ADL tasks.


2=Patient will verbalize/demonstrate understanding of assistive devices/

modifications for ADL.


3=Patient will improve strength/tolerance for activity to enable patient to 

perform ADL's.





OT Long Term Goals


Long Term Goals


Time Frame:  2019


Eating (FIM):  6


Eating (QC):  6


Groomin


Oral Hygiene (QC):  6


Bathing(FIM):  5


Shower/Bathe Self (QC):  5


Upper Body Dressing(FIM):  6


Upper Body Dressing (QC):  6


Lower Body Dressing(FIM):  5


Lower Body Dressing (QC):  5


On/Off Footwear (QC):  5


Toileting(FIM):  5


Toileting Hygiene (QC):  5


Toilet/Commode Transfer(FIM):  5


Toilet/Commode Transfer (QC):  5


Shower Transfer(FIM):  5


Additional Goals:  1-Demonstrate ADL Tasks, 2-Verbalize Understanding, 3-

ImproveStrength/Deepti


1=Demonstrate adherence to instructed precautions during ADL tasks.


2=Patient will verbalize/demonstrate understanding of assistive devices/

modifications for ADL.


3=Patient will improve strength/tolerance for activity to enable patient to 

perform ADL's.





OT Education/Plan


Problem List/Assessment


Pt would benefit from skilled OT to increase his independence in basic self 

care.





Discharge Recommendations


Plan/Recommendations:  Continue POC (anticipated DC tomorrow)





Treatment Plan/Plan of Care


Patient would benefit from OT for education, treatment and training to promote 

independence in ADL's, mobility, safety and/or upper extremity function for ADL'

s.


Plan of Care:  ADL Retraining, Functional Mobility, Group Exercise/Act as Ind (

education, exercise, socialization, funct mobility, memory), UE Funct Exercise/

Act, UE Neuromus Re-Ed/Coord


Treatment Duration:  2019


Frequency:  At least 5 of 7 days/Wk (IRF)


Estimated Hrs Per Day:  1.5 hours per day (1.25 to 1.5)


Agreement:  Yes


Rehab Potential:  Fair





Time/GCodes


Start Time:  08:30


Stop Time:  09:45


Total Time Billed (hr/min):  75


Billed Treatment Time


visit, 75 minutes ADL











PARUL KNOWLES OT 2019 10:01

## 2019-01-02 NOTE — PM&R PROGRESS NOTE
Subjective


This was a face to face visit with the patient.


Date Seen by Provider:  Jan 2, 2019


Time Seen by Provider:  10:30


Subjective/Events-last exam


Patient was seen in in gym therapy room


Patient doing well


Tolerating antibiotics well as Bactrim twice daily


Urine culture pending


No bowel issues


Sent for discharge tomorrow


Review of Systems


General:  Fatigue


Genitourinary:  Frequency


Musculoskeletal:  leg pain





Objective


Physician Exam


Last Set of Vital Signs





Vital Signs








  Date Time  Temp Pulse Resp B/P (MAP) Pulse Ox O2 Delivery O2 Flow Rate FiO2


 


1/2/19 09:00      Room Air  


 


1/2/19 06:00 97.6 78 18 122/71 (88) 92   





Capillary Refill : Less Than 3 Seconds


I&O











Intake and Output 


 


 1/2/19





 00:00


 


Intake Total 1300 ml


 


Balance 1300 ml


 


 


 


Intake Oral 1300 ml


 


# Voids 7


 


# Bowel Movements 1








General:  Alert, Cooperative, No Acute Distress


HEENT:  Atraumatic, PERRLA, EOMI, Mucous Memb Moist/Pink


Neck:  Supple, No JVD


Lungs:  Clear to Auscultation


Heart:  Regular Rate, Other (Systolic murmur)


Abdomen:  Normal Bowel Sounds, Soft, No Tenderness


Extremities:  Other (rt knee brace)


Neuro:  Normal Speech, Strength at 5/5 X4 Ext, Other (strength impaired s/p rt 

Quad repair)


Psych/Mental Status:  Mental Status NL





Results


Lab Data


Laboratory Tests


12/31/18 20:00: 


Urine Color YELLOW, Urine Clarity SLIGHTLY CLOUDY, Urine pH 5, Urine Specific 

Gravity 1.020, Urine Protein NEGATIVE, Urine Glucose (UA) NEGATIVE, Urine 

Ketones NEGATIVE, Urine Nitrite POSITIVEH, Urine Bilirubin NEGATIVE, Urine 

Urobilinogen NORMAL, Urine Leukocyte Esterase 3+H, Urine RBC (Auto) 1+H, Urine 

RBC 0-2, Urine WBC TNTCH, Urine Crystals NONE, Urine Bacteria LARGEH, Urine 

Casts NONE, Urine Mucus NEGATIVE, Urine Culture Indicated YES





Microbiology


12/31/18 Urine Culture - Preliminary, Resulted


           Gram Negative Won





Current Funtional Status


Reviewed physical therapy and occupational therapy evaluation


Sent for discharge on 1/3/19


Will need home health services


Await urine culture results in case antibiotic needs to be changed





Assessment/Plan


Assessment and Plan





(1) Unspecified injury of right quadriceps muscle, fascia and tendon, initial 

encounter


Status:  Acute


(2) COPD (chronic obstructive pulmonary disease)


Qualifiers:  


   Qualified Codes:  J44.9 - Chronic obstructive pulmonary disease, unspecified


Status:  Chronic


(3) Cardiac murmur


Status:  Chronic


(4) GERD (gastroesophageal reflux disease)


Qualifiers:  


   Qualified Codes:  K21.9 - Gastro-esophageal reflux disease without 

esophagitis


Status:  Chronic


(5) UTI (urinary tract infection)


Qualifiers:  


   Qualified Codes:  N30.00 - Acute cystitis without hematuria


(6) Dysuria


Status:  Acute


Co-Morbidities that are continuing to impact the rehab process: (include details

)











REED EGAN DO Jan 2, 2019 11:04

## 2019-01-02 NOTE — NUR
MSW met with patient to review team conference summary.  As discussed on Monday, patient is 
scheduled for discharge tomorrow. team agrees with this recommendation as patient is mod I 
with activities. MSW discussed [facility] at [facility] and 
patient's spouse has requested outpatient.  Spouse can provide transportation to and from 
therapy sessions and requests [facility], MSW will send order.  Patient has utilized a 
walker with therapy; therefore, MSW has requested Dr. Carranza to order for home use. Spouse 
intends to provide transportation home tomorrow at 10 a.m. MSW reviewed IMM and Patient 
Choice Letter with patient. please see discharge summary for further information

## 2019-01-02 NOTE — PHYSICAL THERAPY DAILY NOTE
PT Daily Note-Current


Subjective


Pt sitting at EOB in underware eating breakfast upon arrival.  Pt agrees to PT.





Pain





   Location:  No Pain Reported





Mental Status


Patient Orientation:  Person, Place, Situation


Attachments:  Other-See Comments (R knee immobilizer)





Transfers


Therapy Code Descriptions/Definitions 





Functional [place] Measure:


0=Not Assessed/NA        4=Minimal Assistance


1=Total Assistance        5=Supervision or Setup


2=Maximal Assistance  6=Modified [place]


3=Moderate Assistance 7=Complete [place]








Therapy Quality Codes:


6    Independent with activity with or without an assistive device


5    Patient requires set up or clean up by helper.  Patient completes activity

  by  themselves


4    Supervision or touching assist (CGA). [place] provide cues , steadying 

assist


3    The helper provides less than half the effort to complete the activity


2    The helper provides more than half the effort to complete the activity


1    Dependent.  The helper does all the effort to complete an activity 


7    Patient refused to complete or attempt activity


9    The patient did not perform the activity before the current illness or 

injury


88  Not attempted due to Medical conditions or safety concerns


Transfers (B, C, W/C) (FIM):  5


Scootin


Rollin


Roll Left to Right (QC):  6


Supine to/from Sit:  5


Sit to/from Stand:  5


Sit to Lying (QC):  6


Sit to Stand (QC):  5


Chair/Bed-to-Chair Xfer(QC):  5


Bed to/from Chair:  5


Car Transfer (QC):  5





Weight Bearing


Right Lower Extremity:  Right


Full Weight Bearing


Left Lower Extremity:  Left


Full Weight Bearing





Gait Training


Does the Patient Walk?:  Yes


Gait (FIM):  5


Distance (FIM):  3=150 ft


Distance:  300'


Walk 10 feet (QC):  5


Walk 50 ft with 2 Turns(QC):  5


Walk 150 ft (QC):  5


Walking 10ft/uneven surface-QC:  5


Gait Level of Assist:  5


Gait Persons Needed:  1


Gait Assistive Device:  FWW


Pt needs occasional VC for proper hand placement and sequencing.





Wheelchair Training


Does the Pt Use a Wheelchair?:  No





Stair Training


 Stair Training: Handrails/:  2 handrails


Stairs (FIM):  5


#of Steps:  12


1 Step (curb) (QC):  5


4 Steps (QC):  5


12 Steps (QC):  5


Stairs:  Pattern:  Step to


Level of Assist:  5





Balance


Picking up an Object (QC):  88


Special Test Comments


This item is not attempted due to dizziness when bending over prior to hospital 

stay.





Treatments


Pt completes Bed Mobility, transfers including car transfer and ambulation 

using FWW at SBA for safety.  Pt completes 3 sets of 4 steps using both hand 

rails at SBA.  Pt is not able to complete picking up object from floor due to 

prior dizziness.





Assessment


Current Status:  Good Progress


Pt needs VC to remind about safety cues.





PT Long Term Goals


Long Term Goals


PT Long Term Goals Time Frame:  2019


Transfers (B,C,W/C) (FIM):  5


Sit to Lying (QC):  5


Lying-Sitting on Side/Bed(QC):  5


Sit to Stand (QC):  5


Rollin


Roll Left to Right (QC):  5


Chair/Bed-to-Chair Xfer(QC):  5


Car Transfer (QC):  5


Does the Patient Walk:  Yes


Gait (FIM):  5


Gait distance (FIM):  3=150 ft


Distance:  150'


Walk 10 feet (QC):  5


Walk 10ft-Uneven Surface(QC):  5


Walk 50ft with 2 Turns (QC):  5


Walk 150 ft (QC):  5


Gait Level of Assist:  5


Gait Assistive Device:  FWW


Stairs (FIM):  5


# of Steps:  6


1 Step (curb) (QC):  5


4 Steps (QC):  5


12 Steps (QC):  88


Stairs Level Of Assist:  5


Picking up an Object (QC):  5





PT Plan


Problem List


Problem List:  Activity Tolerance, Functional Strength, Safety





Treatment/Plan


Treatment Plan:  Continue Plan of Care


Treatment Plan:  Bed Mobility, Education, Functional Activity Deepti, Functional 

Strength, Group Therapy, Gait, Safety, Therapeutic Exercise, Transfers


Treatment Duration:  2019


Frequency:  At least 5 of 7 days/Wk (IRF)


Estimated Hrs Per Day:  1.5 hours per day


Patient and/or Family Agrees t:  Yes





Safety Risks/Education


Patient Education:  Gait Training, Transfer Techniques, Correct Positioning, 

Safety Issues


Teaching Recipient:  Patient


Teaching Methods:  Discussion


Response to Teaching:  Verbalize Understanding





Time/GCodes


Time In:  1000


Time Out:  1100


Total Billed Treatment Time:  60


Total Billed Treatment


1, GT (15m), FA x2 (30m) & EX (15m)


G Codes Necessary:  TING Berrios PTA 2019 11:12

## 2019-01-02 NOTE — SPEECH THERAPY DAILY NOTE
Speech Daily Progress Note


Subjective


Date Seen by Provider:  2019


Time Seen by Provider:  00:30


Patient was resting in his bed when I entered the room. He is anxious to go 

home tomorrow.





Objective


Patient completed memory tasks at 90% with minimal verbal cues.





Treatment Plan


Discontinue ST, Goals Met





Communication


Comprehension:  6


Expression:  6





Social Cognition


Social Interaction:  6


Problem Solvin


Memory:  6





Speech Short Term Goals


Short Term Goals


Short Term Goals


1) Patient will recall information/procedures related to personal safety with 80

% or greater accuracy. Met


2) Patient will demo follow through with 1-2 step directions with 80% or 

greater accuracy. Met


3) Patient will complete memory tasks with 80% or greater accuracy. Met





Speech Long Term Goals


Long Term Goals


Patient will improve safety awareness and independence for personal needs in 

order to return home safely. Met at his highest potential level of function.





Speech-Plan


Patient/Family Goals


Patient/Family Goals:  


Patient is scheduled to return home with his wife tomorrow.





Treatment Plan


Speech Therapy Treatment Plan:  Discontinue ST, Goals Met


Patient has met his highest potential level of function.


Treatment Duration:  Diony 3, 2019


Frequency:  5 times per week


Estimated Hrs Per Day:  .5 hour per day


Rehab Potential:  Fair


Barriers to Learning:  


Patient has memory deficit with some new information.


Pt/Family Agrees to Plan:  Yes





Safety Risks/Education


Teaching Recipient:  Patient


Teaching Methods:  Discussion


Response to Teaching:  Verbalize Understanding


Education Topics Provided:  


Safety when he returns home.





Time


Speech Therapy Time In:  14:00


Speech Therapy Time Out:  14:30


Total Billed Time:  30


Billed Treatment Time


1, LUPILLO Cassidy 2019 15:33

## 2019-01-02 NOTE — PROGRESS NOTE-STANDARD
Standard Progress Note


Progress Notes/Assess & Plan


Date Seen by a Provider:  Jan 2, 2019


Time Seen by a Provider:  07:30


Progress/Assessment & Plan


no current complaints


 RLE brace and dressing in place


NVI distally


 s/p R quad repair


ok to wbat in brace


Final Diagnosis


feeling better.





Vital Signs








  Date Time  Temp Pulse Resp B/P (MAP) Pulse Ox O2 Delivery O2 Flow Rate FiO2


 


1/2/19 06:00 97.6 78 18 122/71 (88) 92 Room Air  


 


1/1/19 20:35      Room Air  


 


1/1/19 17:16 96.5 69 18 97/62 (74) 99 Room Air  


 


1/1/19 09:00      Room Air  














I & O 


 


 1/2/19





 07:00


 


Intake Total 1250 ml


 


Balance 1250 ml





RLE--incision clean and dry. No calf tenderness. Neg Lyn's


s/p R quad repair


dc staples


fu in two weeks











SHILOH LOBO MD Jan 2, 2019 08:19

## 2019-01-02 NOTE — PHYSICAL THERAPY DAILY NOTE
PT Daily Note-Current


Subjective


Pt laying Supine in bed asleep upon arrival.  Pt agrees to Supine Ex in bed for 

tx.





Mental Status


Patient Orientation:  Person, Place, Situation


Attachments:  Other-See Comments (R knee immobilizer)





Transfers


Therapy Code Descriptions/Definitions 





Functional Cherry Measure:


0=Not Assessed/NA        4=Minimal Assistance


1=Total Assistance        5=Supervision or Setup


2=Maximal Assistance  6=Modified Cherry


3=Moderate Assistance 7=Complete Cherry








Therapy Quality Codes:


6    Independent with activity with or without an assistive device


5    Patient requires set up or clean up by helper.  Patient completes activity

  by  themselves


4    Supervision or touching assist (CGA). Brant provide cues , steadying 

assist


3    The helper provides less than half the effort to complete the activity


2    The helper provides more than half the effort to complete the activity


1    Dependent.  The helper does all the effort to complete an activity 


7    Patient refused to complete or attempt activity


9    The patient did not perform the activity before the current illness or 

injury


88  Not attempted due to Medical conditions or safety concerns





Weight Bearing


Right Lower Extremity:  Right


Full Weight Bearing


Left Lower Extremity:  Left


Full Weight Bearing





Exercises


Supine Ex:  Ankle pumps, Quad Set, Glut sets, Heel Slides (L side only ), 

Scooting, Straight leg raise, Hip abd/add


Supine Reps:  15





Treatments


Pt completes Supine Ex in bed with a couple of short rest breaks.  Pt transfers 

to EOB then standing using FWW to use restroom.  Pt has all needs met at end of 

tx.





Assessment


Current Status:  Good Progress


Pt is not always socially aware of safety.





PT Long Term Goals


Long Term Goals


PT Long Term Goals Time Frame:  2019


Transfers (B,C,W/C) (FIM):  5


Sit to Lying (QC):  5


Lying-Sitting on Side/Bed(QC):  5


Sit to Stand (QC):  5


Rollin


Roll Left to Right (QC):  5


Chair/Bed-to-Chair Xfer(QC):  5


Car Transfer (QC):  5


Does the Patient Walk:  Yes


Gait (FIM):  5


Gait distance (FIM):  3=150 ft


Distance:  150'


Walk 10 feet (QC):  5


Walk 10ft-Uneven Surface(QC):  5


Walk 50ft with 2 Turns (QC):  5


Walk 150 ft (QC):  5


Gait Level of Assist:  5


Gait Assistive Device:  FWW


Stairs (FIM):  5


# of Steps:  6


1 Step (curb) (QC):  5


4 Steps (QC):  5


12 Steps (QC):  88


Stairs Level Of Assist:  5


Picking up an Object (QC):  5





PT Plan


Problem List


Problem List:  Activity Tolerance, Functional Strength, Safety





Treatment/Plan


Treatment Plan:  Continue Plan of Care


Treatment Plan:  Bed Mobility, Education, Functional Activity Deepti, Functional 

Strength, Group Therapy, Gait, Safety, Therapeutic Exercise, Transfers


Treatment Duration:  2019


Frequency:  At least 5 of 7 days/Wk (IRF)


Estimated Hrs Per Day:  1.5 hours per day


Patient and/or Family Agrees t:  Yes





Safety Risks/Education


Patient Education:  Gait Training, Transfer Techniques, Correct Positioning, 

Safety Issues


Teaching Recipient:  Patient


Teaching Methods:  Discussion


Response to Teaching:  Verbalize Understanding





Time/GCodes


Time In:  1300


Time Out:  1330


Total Billed Treatment Time:  30


Total Billed Treatment


 1, EX (20m) & FA (10m)


G Codes Necessary:  TING Berrios PTA 2019 14:11

## 2019-01-03 VITALS — SYSTOLIC BLOOD PRESSURE: 107 MMHG | DIASTOLIC BLOOD PRESSURE: 66 MMHG

## 2019-01-03 RX ADMIN — SULFAMETHOXAZOLE AND TRIMETHOPRIM SCH EA: 800; 160 TABLET ORAL at 06:08

## 2019-01-03 RX ADMIN — FLUTICASONE PROPIONATE AND SALMETEROL XINAFOATE SCH PUFF: 115; 21 AEROSOL, METERED RESPIRATORY (INHALATION) at 08:13

## 2019-01-03 NOTE — DISCHARGE SUMMARY
Diagnosis/Chief Complaint


Date of Admission


Dec 21, 2018 at 09:31


Date of Discharge


Diony 3, 2019 at 12:04


Discharge Date:  Diony 3, 2019


Discharge Time:  1000


Discharge Diagnosis


Assessment:


Right quadriceps tendon disruption with medial and lateral meniscus tears 

prohibiting ambulation


COPD


Cardiac murmur


Depression


Suicidal statements now he denies he stated it


GERD


Acute UTI





Discharge Summary


Discharge Physical Examination


Allergies:  


Coded Allergies:  


     Penicillins (Unverified  Allergy, Unknown, HIVES, 18)


     nut - unspecified (Unverified  Allergy, Unknown, 18)


 


 FROM UNCODED ALLERGIES


Vitals & I&Os





Vital Signs








  Date Time  Temp Pulse Resp B/P (MAP) Pulse Ox O2 Delivery O2 Flow Rate FiO2


 


1/3/19 11:56  74 18 107/66 97 Room Air  


 


1/3/19 06:00 97.2       











Hospital Course


Patient had a standard inpatient rehab hospital course.  Pain was controlled 

well with pain medication.  He participated in physical therapy fairly well and 

hopefully will maintain compliance at home.  Labs were reviewed on a periodic 

basis all remained stable.  Bowel function remained stable and normal for 

patient.  Orthopedic surgery support was appreciated.  Psychiatric evaluation 

ensued due to suicidal statements when he was first admitted but then denied 

saying that.  UTI was diagnosed 3 days prior to discharge and final urine 

culture showed E. coli sensitive to Bactrim which was placed empirically.


Labs (last 24 hrs)


Laboratory Tests


18 05:39: 


White Blood Count 6.1, Red Blood Count 3.59L, Hemoglobin 11.9L, Hematocrit 36L, 

Mean Corpuscular Volume 101H, Mean Corpuscular Hemoglobin 33, Mean Corpuscular 

Hemoglobin Concent 33, Red Cell Distribution Width 13.3, Platelet Count 338, 

Mean Platelet Volume 8.8, Neutrophils (%) (Auto) 62, Lymphocytes (%) (Auto) 26, 

Monocytes (%) (Auto) 9, Eosinophils (%) (Auto) 2, Basophils (%) (Auto) 1, 

Neutrophils # (Auto) 3.8, Lymphocytes # (Auto) 1.6, Monocytes # (Auto) 0.6, 

Eosinophils # (Auto) 0.1, Basophils # (Auto) 0.0, Sodium Level 139, Potassium 

Level 4.2, Chloride Level 110H, Carbon Dioxide Level 18L, Anion Gap 11, Blood 

Urea Nitrogen 16, Creatinine 0.92, Estimat Glomerular Filtration Rate > 60, BUN/

Creatinine Ratio 17, Glucose Level 115H, Calcium Level 9.1, Corrected Calcium 

9.4, Total Bilirubin 0.4, Aspartate Amino Transf (AST/SGOT) 28, Alanine 

Aminotransferase (ALT/SGPT) 48, Alkaline Phosphatase 72, Total Protein 6.8, 

Albumin 3.6


18 20:00: 


Urine Color YELLOW, Urine Clarity SLIGHTLY CLOUDY, Urine pH 5, Urine Specific 

Gravity 1.020, Urine Protein NEGATIVE, Urine Glucose (UA) NEGATIVE, Urine 

Ketones NEGATIVE, Urine Nitrite POSITIVEH, Urine Bilirubin NEGATIVE, Urine 

Urobilinogen NORMAL, Urine Leukocyte Esterase 3+H, Urine RBC (Auto) 1+H, Urine 

RBC 0-2, Urine WBC TNTCH, Urine Crystals NONE, Urine Bacteria LARGEH, Urine 

Casts NONE, Urine Mucus NEGATIVE, Urine Culture Indicated YES





Microbiology


18 Urine Culture - Final, Complete


           Escherichia coli





Pending Labs





Microbiology








 Date/Time


Source Procedure


Growth Status





 


 18 20:00


Urine Clean Catch Urine Culture - Final


Escherichia coli Complete





Laboratory Tests


18 05:39: 


White Blood Count 6.1, Red Blood Count 3.59, Hemoglobin 11.9, Hematocrit 36, 

Mean Corpuscular Volume 101, Mean Corpuscular Hemoglobin 33, Mean Corpuscular 

Hemoglobin Concent 33, Red Cell Distribution Width 13.3, Platelet Count 338, 

Mean Platelet Volume 8.8, Neutrophils (%) (Auto) 62, Lymphocytes (%) (Auto) 26, 

Monocytes (%) (Auto) 9, Eosinophils (%) (Auto) 2, Basophils (%) (Auto) 1, 

Neutrophils # (Auto) 3.8, Lymphocytes # (Auto) 1.6, Monocytes # (Auto) 0.6, 

Eosinophils # (Auto) 0.1, Basophils # (Auto) 0.0, Sodium Level 139, Potassium 

Level 4.2, Chloride Level 110, Carbon Dioxide Level 18, Anion Gap 11, Blood 

Urea Nitrogen 16, Creatinine 0.92, Estimat Glomerular Filtration Rate > 60, BUN/

Creatinine Ratio 17, Glucose Level 115, Calcium Level 9.1, Corrected Calcium 9.4

, Total Bilirubin 0.4, Aspartate Amino Transf (AST/SGOT) 28, Alanine 

Aminotransferase (ALT/SGPT) 48, Alkaline Phosphatase 72, Total Protein 6.8, 

Albumin 3.6


18 20:00: 


Urine Color YELLOW, Urine Clarity SLIGHTLY CLOUDY, Urine pH 5, Urine Specific 

Gravity 1.020, Urine Protein NEGATIVE, Urine Glucose (UA) NEGATIVE, Urine 

Ketones NEGATIVE, Urine Nitrite POSITIVE, Urine Bilirubin NEGATIVE, Urine 

Urobilinogen NORMAL, Urine Leukocyte Esterase 3+, Urine RBC (Auto) 1+, Urine 

RBC 0-2, Urine WBC TNTC, Urine Crystals NONE, Urine Bacteria LARGE, Urine Casts 

NONE, Urine Mucus NEGATIVE, Urine Culture Indicated YES








Discharge


Home Medications:





Active Scripts


Active


Bactrim Ds Tablet (Sulfamethoxazole/Trimethoprim) 1 Each Tablet 1 Each PO BID


Percocet 5-325 mg Tablet (Oxycodone HCl/Acetaminophen) 1 Each Tablet 1 Tab PO 

Q4H PRN


Citalopram HBr (Citalopram Hydrobromide) 20 Mg Tablet 20 Mg PO DAILY


Reported


Breo Ellipta 200-25 Mcg INH (Fluticasone/Vilanterol) 1 Each Blst.w.dev 1 Puff 

IH DAILY





Instructions to patient/family


Please see electronic discharge instructions given to patient.





Diagnosis/Problems


Diagnosis/Problems





(1) Unspecified injury of right quadriceps muscle, fascia and tendon, initial 

encounter


Status:  Acute


(2) COPD (chronic obstructive pulmonary disease)


Status:  Chronic


Qualifiers:  


   Qualified Codes:  J44.9 - Chronic obstructive pulmonary disease, unspecified


(3) Cardiac murmur


Status:  Chronic


(4) GERD (gastroesophageal reflux disease)


Status:  Chronic


Qualifiers:  


   Qualified Codes:  K21.9 - Gastro-esophageal reflux disease without 

esophagitis


(5) UTI (urinary tract infection)


Qualifiers:  


   Qualified Codes:  N30.00 - Acute cystitis without hematuria


(6) Dysuria


Status:  Acute





Clinical Quality Measures


DVT/VTE Risk/Contraindication:


Risk Factor Score Per Nursin


RFS Level Per Nursing on Admit:  4+=Very High











REED EGAN DO Diony 3, 2019 12:15

## 2019-01-03 NOTE — THERAPY TEAM DISCHARGE SUMMARY
Therapy Discharge Summary


Discharge Recommendations


Date of Discharge


1/3/2019


Therapy D/C Recommendations:  Home w/ Family Support, Physical Therapy Home Care

, Nursing Home Placement





Occupational Therapy


Decreased Activ Tolerance, Decreased Safety Aware, Decreased UE Strength, 

Dependent Transfers, Impaired Cognition, Impaired Self-Care Skills





Speech-Language Pathology


Patient was admitted to ARU post knee surgery. He initially was supposed to 

have had an outpatient procedure, however due to a decline in function he was 

admitted to the ARU for skilled therapy. Patient was evaluated by ST at the 

time of admit with a noted deficit in the memory and problem solving areas. He 

has made good progress with these areas.  He is being discharged from the ARU 

to home with his wife as caregiver. ST services are discharged this date.





PT Long Term Goals


Long Term Goals


PT Long Term Goals Time Frame:  Jan 12, 2019


Transfers (B,C,W/C) (FIM):  5


Roll Left to Right (QC):  5


Sit to Lying (QC):  5


Lying-Sitting on Side/Bed(QC):  5


Sit to Stand (QC):  5


Chair/Bed-to-Chair Xfer(QC):  5


Car Transfer (QC):  5


Does the Patient Walk:  Yes


Gait (FIM):  5


Gait distance (FIM):  3=150 ft


Distance:  150'


Walk 10 feet (QC):  5


Walk 10ft-Uneven Surface(QC):  5


Walk 50ft with 2 Turns (QC):  5


Walk 150 ft (QC):  5


Gait Level of Assist:  5


Gait Assistive Device:  FWW


Stairs (FIM):  5


# of Steps:  6


1 Step (curb) (QC):  5


4 Steps (QC):  5


12 Steps (QC):  88


Stairs Level Of Assist:  5


Picking up an Object (QC):  5





OT Long Term Goals


Long Term Goals


Time Frame:  Jan 12, 2019


Eating (FIM):  6


Eating (QC):  6


Oral Hygiene (QC):  6


Grooming(FIM):  6


Bathing(FIM):  5


Shower/Bathe Self (QC):  5


Upper Body Dressing(FIM):  6


Upper Body Dressing (QC):  6


Lower Body Dressing(FIM):  5


Lower Body Dressing (QC):  5


On/Off Footwear (QC):  5


Toileting(FIM):  5


Toileting Hygiene (QC):  5


Toilet/Commode Transfer(FIM):  5


Toilet/Commode Transfer (QC):  5


Shower Transfer(FIM):  5


Additional Goals:  1-Demonstrate ADL Tasks, 2-Verbalize Understanding, 3-

ImproveStrength/Deepti


1=Demonstrate adherence to instructed precautions during ADL tasks.


2=Patient will verbalize/demonstrate understanding of assistive devices/

modifications for ADL.


3=Patient will improve strength/tolerance for activity to enable patient to 

perform ADL's.





Speech Long Term Goals


Long Term Goals


Patient will improve safety awareness and independence for personal needs in 

order to return home safely. Met at his highest potential level of function. Met











LUPILLO MORALES Diony 3, 2019 11:29

## 2019-01-03 NOTE — NUR
NYDIA GRANADO demonstrates understanding of discharge instructions and accurately returns 
instructions upon questioning.  Copy of Post-Discharge Instructions given to pt. 
NYDIA GRANADO is able to manage continuing needs after discharge with HHC and wifes help.  
Patients belongings returned to .  Patient discharged from 224-1 on 1-3-19  at 1110. 
NYDIA GRANADO left floor via , accompanied by .

While applying the brace to pt, pt stated that was the last time that brace is going to be 
put on. Follow up instructions from Dr Cottrell given to pt and summarized that with pt and 
pts wife. Pt also asked his wife if she could someone to get the hospital bed out of the 
barn and brought into the house. Discussed that it was not appropriate at this time. 
Discussed the need to maximize his independence.

## 2019-01-03 NOTE — NUR
MSW received notification from [facility] and [facility] that in stock 
front-wheeled walkers will not accommodate patient's height.  MSW then contacted [facility] and they are able to accommodate height needs.  MSW reviewed 
information with patient and spouse and patient verbalized intent to use crutches.  MSW 
reminded patient and wife that crutches are not recommended at this time; however, patient 
has exhibited noncompliant behavior.  Patient spouse identified no further needs.  Please 
see discharge summary for further information

## 2019-01-03 NOTE — THERAPY TEAM DISCHARGE SUMMARY
Therapy Discharge Summary


Discharge Recommendations


Date of Discharge





Therapy D/C Recommendations:  Home w/ Family Support, Physical Therapy Home Care

, Nursing Home Placement





Physical Therapy


Patient came to rehab with right patellar tendon repair.  Upon evaluation 

patient performed bed mobility and transfers with mod assist, ambulated 75' 

with a rolling walker with CGA, went up and down 1 step using a rolling walker 

with CGA.  Patient has been performing bed mobility and transfer training, 

balance and endurance training, functional strengthening, stair training, gait 

training, and education.  Patient has made fair progress and has met all of his 

long term goals.  Now, patient performs bed mobility with mod I, supine <-> sit 

with SBA, sit <-> stand SBA, transfers SBA, car transfer SBA, ambulates 300' 

with a rolling walker with SBA (including 50' with at least 2 turns of 90 

degrees and 10' over an uneven surface), and can go up and down 12 steps using 

2 handrails with SBA.  Patient is being discharged from this facility today and 

will be discharged from PT at this time.





Occupational Therapy


Decreased Activ Tolerance, Decreased Safety Aware, Decreased UE Strength, 

Dependent Transfers, Impaired Cognition, Impaired Self-Care Skills





PT Long Term Goals


Long Term Goals


PT Long Term Goals Time Frame:  Jan 12, 2019


Transfers (B,C,W/C) (FIM):  5


Roll Left to Right (QC):  5


Sit to Lying (QC):  5


Lying-Sitting on Side/Bed(QC):  5


Sit to Stand (QC):  5


Chair/Bed-to-Chair Xfer(QC):  5


Car Transfer (QC):  5


Does the Patient Walk:  Yes


Gait (FIM):  5


Gait distance (FIM):  3=150 ft


Distance:  150'


Walk 10 feet (QC):  5


Walk 10ft-Uneven Surface(QC):  5


Walk 50ft with 2 Turns (QC):  5


Walk 150 ft (QC):  5


Gait Level of Assist:  5


Gait Assistive Device:  FWW


Stairs (FIM):  5


# of Steps:  6


1 Step (curb) (QC):  5


4 Steps (QC):  5


12 Steps (QC):  88


Stairs Level Of Assist:  5


Picking up an Object (QC):  5





OT Long Term Goals


Long Term Goals


Time Frame:  Jan 12, 2019


Eating (FIM):  6


Eating (QC):  6


Oral Hygiene (QC):  6


Grooming(FIM):  6


Bathing(FIM):  5


Shower/Bathe Self (QC):  5


Upper Body Dressing(FIM):  6


Upper Body Dressing (QC):  6


Lower Body Dressing(FIM):  5


Lower Body Dressing (QC):  5


On/Off Footwear (QC):  5


Toileting(FIM):  5


Toileting Hygiene (QC):  5


Toilet/Commode Transfer(FIM):  5


Toilet/Commode Transfer (QC):  5


Shower Transfer(FIM):  5


Additional Goals:  1-Demonstrate ADL Tasks, 2-Verbalize Understanding, 3-

ImproveStrength/Deepti


1=Demonstrate adherence to instructed precautions during ADL tasks.


2=Patient will verbalize/demonstrate understanding of assistive devices/

modifications for ADL.


3=Patient will improve strength/tolerance for activity to enable patient to 

perform ADL's.





Speech Long Term Goals


Long Term Goals


Patient will improve safety awareness and independence for personal needs in 

order to return home safely. Met at his highest potential level of function.











CHIO HAYWARD PT Diony 3, 2019 10:17

## 2019-01-07 NOTE — THERAPY TEAM DISCHARGE SUMMARY
Therapy Discharge Summary


Discharge Recommendations


Date of Discharge


Diony 3, 2019 at 12:04


Therapy D/C Recommendations:  Home w/ Family Support, Physical Therapy Home Care





Occupational Therapy


Pt. has been seen by occupational therapy to increase overall strength and 

independence.  Pt. requires supervision/SBA with most tasks due to safety.  Has 

met goal of eating and bathing, toileting.  Did not achieve full Mod I with all 

other goals.  Pt. has discharged home with family support. All equipment needs 

have been met.


Decreased Activ Tolerance, Decreased Safety Aware, Impaired Self-Care Skills





PT Long Term Goals


Long Term Goals


PT Long Term Goals Time Frame:  Jan 12, 2019


Transfers (B,C,W/C) (FIM):  5


Roll Left to Right (QC):  5


Sit to Lying (QC):  5


Lying-Sitting on Side/Bed(QC):  5


Sit to Stand (QC):  5


Chair/Bed-to-Chair Xfer(QC):  5


Car Transfer (QC):  5


Does the Patient Walk:  Yes


Gait (FIM):  5


Gait distance (FIM):  3=150 ft


Distance:  150'


Walk 10 feet (QC):  5


Walk 10ft-Uneven Surface(QC):  5


Walk 50ft with 2 Turns (QC):  5


Walk 150 ft (QC):  5


Gait Level of Assist:  5


Gait Assistive Device:  FWW


Stairs (FIM):  5


# of Steps:  6


1 Step (curb) (QC):  5


4 Steps (QC):  5


12 Steps (QC):  88


Stairs Level Of Assist:  5


Picking up an Object (QC):  5





OT Long Term Goals


Long Term Goals


Time Frame:  Jan 12, 2019


Eating (FIM):  6 (met)


Eating (QC):  6 (met)


Oral Hygiene (QC):  6 (met)


Grooming(FIM):  6 (not met)


Bathing(FIM):  5 (met)


Shower/Bathe Self (QC):  5 (not met)


Upper Body Dressing(FIM):  6 (not met)


Upper Body Dressing (QC):  6 (not met)


Lower Body Dressing(FIM):  5 (not met)


Lower Body Dressing (QC):  5 (not met)


On/Off Footwear (QC):  5 (not met)


Toileting(FIM):  5 (met)


Toileting Hygiene (QC):  5 (met)


Toilet/Commode Transfer(FIM):  5 (met)


Toilet/Commode Transfer (QC):  5 (met)


Shower Transfer(FIM):  5 (met)


Additional Goals:  1-Demonstrate ADL Tasks, 2-Verbalize Understanding, 3-

ImproveStrength/Deepti


1=Demonstrate adherence to instructed precautions during ADL tasks.


2=Patient will verbalize/demonstrate understanding of assistive devices/

modifications for ADL.


3=Patient will improve strength/tolerance for activity to enable patient to 

perform ADL's.





Speech Long Term Goals


Long Term Goals


Patient will improve safety awareness and independence for personal needs in 

order to return home safely. Met at his highest potential level of function. Met











MARÍA ELENA GARCIA OT Jan 7, 2019 09:04

## 2019-03-07 ENCOUNTER — HOSPITAL ENCOUNTER (OUTPATIENT)
Dept: HOSPITAL 75 - REHAB | Age: 79
Discharge: HOME | End: 2019-03-07
Attending: NURSE PRACTITIONER
Payer: MEDICARE

## 2019-03-07 DIAGNOSIS — S76.111D: Primary | ICD-10-CM
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
## 2022-07-07 ENCOUNTER — HOSPITAL ENCOUNTER (OUTPATIENT)
Dept: HOSPITAL 75 - PREOP | Age: 82
LOS: 1 days | Discharge: HOME | End: 2022-07-08
Attending: SURGERY
Payer: MEDICARE

## 2022-07-07 VITALS — HEIGHT: 67.72 IN | WEIGHT: 240.3 LBS | BODY MASS INDEX: 36.84 KG/M2

## 2022-07-07 DIAGNOSIS — Z01.818: Primary | ICD-10-CM

## 2022-07-14 ENCOUNTER — HOSPITAL ENCOUNTER (OUTPATIENT)
Dept: HOSPITAL 75 - SDC | Age: 82
Discharge: HOME | End: 2022-07-14
Attending: SURGERY
Payer: MEDICARE

## 2022-07-14 VITALS — SYSTOLIC BLOOD PRESSURE: 118 MMHG | DIASTOLIC BLOOD PRESSURE: 71 MMHG

## 2022-07-14 VITALS — SYSTOLIC BLOOD PRESSURE: 128 MMHG | DIASTOLIC BLOOD PRESSURE: 74 MMHG

## 2022-07-14 VITALS — WEIGHT: 240.3 LBS | BODY MASS INDEX: 36.84 KG/M2 | HEIGHT: 67.72 IN

## 2022-07-14 VITALS — SYSTOLIC BLOOD PRESSURE: 133 MMHG | DIASTOLIC BLOOD PRESSURE: 78 MMHG

## 2022-07-14 VITALS — SYSTOLIC BLOOD PRESSURE: 139 MMHG | DIASTOLIC BLOOD PRESSURE: 76 MMHG

## 2022-07-14 VITALS — DIASTOLIC BLOOD PRESSURE: 74 MMHG | SYSTOLIC BLOOD PRESSURE: 130 MMHG

## 2022-07-14 VITALS — SYSTOLIC BLOOD PRESSURE: 111 MMHG | DIASTOLIC BLOOD PRESSURE: 70 MMHG

## 2022-07-14 VITALS — SYSTOLIC BLOOD PRESSURE: 117 MMHG | DIASTOLIC BLOOD PRESSURE: 69 MMHG

## 2022-07-14 VITALS — SYSTOLIC BLOOD PRESSURE: 129 MMHG | DIASTOLIC BLOOD PRESSURE: 76 MMHG

## 2022-07-14 DIAGNOSIS — C44.722: Primary | ICD-10-CM

## 2022-07-14 PROCEDURE — 87081 CULTURE SCREEN ONLY: CPT

## 2022-07-14 NOTE — OPERATIVE REPORT
DATE OF SERVICE:  07/14/2022



PREOPERATIVE DIAGNOSIS:

Squamous cell carcinoma, right lower extremity.



POSTOPERATIVE DIAGNOSIS:

Squamous cell carcinoma, right lower extremity.



PROCEDURE:

Excision of right lower extremity squamous cell carcinoma 6.2 x 2 cm.



SURGEON:

Mandeep Urena DO



ANESTHESIA:

MAC with local.



ESTIMATED BLOOD LOSS:

Minimal.



COMPLICATIONS:

None.



INDICATIONS:

The patient is an 81-year-old male with squamous cell carcinoma of the right

lower extremity from previous biopsy, sent to me for excision.  He understands

risks and benefits of procedure and wishes to proceed.  Consent was signed in

the chart.



DESCRIPTION OF PROCEDURE:

The patient was taken to the operating suite, prepped and draped in sterile

fashion.  Timeout was performed.  Local anesthetic was infiltrated around the

lesion.  A 15 blade scalpel was used to make a skin incision around the lesion

measuring 6.2 x 2 cm.  The 15 blade scalpel was used to excise the skin and

subcutaneous tissues.  Once removed, sutures were placed, short suture

superiorly and long suture laterally.  Cautery was used to achieve hemostasis. 

The skin circumferentially was mobilized, the skin was then closed using 2-0

Prolene in simple interrupted fashion.  The patient tolerated procedure well

without any complications.  He was taken to recovery room in stable condition.





Job ID: [id number]

DocumentID: [id number]

Dictated Date:  07/14/2022 12:52:22

Transcription Date: 07/14/2022 21:06:33

Dictated By: MANDEEP URENA DO

## 2022-07-14 NOTE — DISCHARGE INST-SIMPLE/STANDARD
Discharge Inst-Standard


Patient Instructions/Follow Up


Plan of Care/Instructions/FU:  


2 weeks Ayanna (suture removal)


Activity as Tolerated:  No


Discharge Diet:  Regular Diet


Other Inst to Patient


Follow up Appt:


Make appointment for 2 week.





Instructions:


No lifting greater than 10 pounds.


No strenuous activity. 


May shower in 24 hours, no tub bath or soaking.


Use incentive spirometer at home as directed.


No Smoking





Skin/Wound Care:


Keep area clean and dry.





Symptoms to Report:


Appetite Changes, Extremity Discoloration, Numbness/Tingling, Swelling 

Increased, Bleeding Excessive, Eyesight Changes, Pain Increased, Urine Color 

Change, Constipation(Persistent), Fever over 101 degree F, Pain/Pressure in c

hest, Urinating Difficulty, Cough Up/Vomit Blood, Heart Beat Irreg/Pounding, 

Pain/Pressure in jaw, Vaginal Bleeding Increase, Cramps in feet or legs, 

Lightheadedness, Pain/Pressure in shoulder, Diarrhea(Persistent), Memory Changes

Suddenly, Questions/Concerns, Weight gain consecutive days, Dizziness/Fainting, 

Nausea/Vomiting, Shortness of Breath, Weight gain over 2 pounds








If questions or concerns contact your physician 


Or seek help at emergency department.











MANDEEP KING DO              Jul 14, 2022 14:28

## 2022-07-14 NOTE — PROGRESS NOTE-POST OPERATIVE
Post-Operative Progess Note


Surgeon (s)/Assistant (s)


Surgeon


MANDEEP KING DO


Assistant:  na





Pre-Operative Diagnosis


squamous cell right lower extremity





Post-Operative Diagnosis





same





Procedure & Operative Findings


Date of Procedure


7/14/22


Procedure Performed/Findings


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


Anesthesia Type


mac c local





Estimated Blood Loss


Estimated blood loss (mL):  minimal





Specimens/Packing


Specimens Removed


skin and subcutaneous tissue











MANDEEP KING DO              Jul 14, 2022 12:49

## 2022-07-14 NOTE — PROGRESS NOTE-PRE OPERATIVE
Pre-Operative Progress Note


H&P Reviewed


The H&P was reviewed, patient examined and no changes noted.


Date Seen by Provider:  Jul 14, 2022


Time Seen by Provider:  11:22


Date H&P Reviewed:  Jul 14, 2022


Time H&P Reviewed:  11:22


Pre-Operative Diagnosis:  squamous cell right lower extremity











MANDEEP KING DO              Jul 14, 2022 11:22

## 2022-07-14 NOTE — ANESTHESIA-GENERAL POST-OP
General


Patient Condition


Mental Status/LOC:  Same as Preop


Cardiovascular:  Satisfactory


Nausea/Vomiting:  Absent


Respiratory:  Satisfactory


Pain:  Controlled


Complications:  Absent





Post Op Complications


Complications


None





Follow Up Care/Instructions


Patient Instructions


None needed.





Anesthesia/Patient Condition


Patient Condition


Patient is doing well, no complaints, stable vital signs, no apparent adverse 

anesthesia problems.   


No complications reported per nursing.











ASHLEY YUN CRNA          Jul 14, 2022 14:47

## 2022-08-18 ENCOUNTER — HOSPITAL ENCOUNTER (EMERGENCY)
Dept: HOSPITAL 75 - ER | Age: 82
Discharge: HOME | End: 2022-08-18
Payer: MEDICARE

## 2022-08-18 VITALS — SYSTOLIC BLOOD PRESSURE: 142 MMHG | DIASTOLIC BLOOD PRESSURE: 75 MMHG

## 2022-08-18 VITALS — SYSTOLIC BLOOD PRESSURE: 112 MMHG | DIASTOLIC BLOOD PRESSURE: 63 MMHG

## 2022-08-18 VITALS — SYSTOLIC BLOOD PRESSURE: 137 MMHG | DIASTOLIC BLOOD PRESSURE: 79 MMHG

## 2022-08-18 DIAGNOSIS — M62.838: Primary | ICD-10-CM
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
## 2022-08-18 DIAGNOSIS — Z28.310: ICD-10-CM

## 2022-08-18 DIAGNOSIS — I95.1: ICD-10-CM

## 2022-08-18 LAB
BASOPHILS # BLD AUTO: 0 10^3/UL (ref 0–0.1)
BASOPHILS NFR BLD AUTO: 1 % (ref 0–10)
BUN/CREAT SERPL: 9
CALCIUM SERPL-MCNC: 9.2 MG/DL (ref 8.5–10.1)
CHLORIDE SERPL-SCNC: 108 MMOL/L (ref 98–107)
CO2 SERPL-SCNC: 20 MMOL/L (ref 21–32)
CREAT SERPL-MCNC: 1.12 MG/DL (ref 0.6–1.3)
EOSINOPHIL # BLD AUTO: 0.1 10^3/UL (ref 0–0.3)
EOSINOPHIL NFR BLD AUTO: 2 % (ref 0–10)
GFR SERPLBLD BASED ON 1.73 SQ M-ARVRAT: 66 ML/MIN
GLUCOSE SERPL-MCNC: 130 MG/DL (ref 70–105)
HCT VFR BLD CALC: 35 % (ref 40–54)
HGB BLD-MCNC: 11.9 G/DL (ref 13.3–17.7)
LYMPHOCYTES # BLD AUTO: 1.6 10^3/UL (ref 1–4)
LYMPHOCYTES NFR BLD AUTO: 29 % (ref 12–44)
MANUAL DIFFERENTIAL PERFORMED BLD QL: NO
MCH RBC QN AUTO: 34 PG (ref 25–34)
MCHC RBC AUTO-ENTMCNC: 34 G/DL (ref 32–36)
MCV RBC AUTO: 100 FL (ref 80–99)
MONOCYTES # BLD AUTO: 0.6 10^3/UL (ref 0–1)
MONOCYTES NFR BLD AUTO: 11 % (ref 0–12)
NEUTROPHILS # BLD AUTO: 3.2 10^3/UL (ref 1.8–7.8)
NEUTROPHILS NFR BLD AUTO: 57 % (ref 42–75)
PLATELET # BLD: 194 10^3/UL (ref 130–400)
PMV BLD AUTO: 8.9 FL (ref 9–12.2)
POTASSIUM SERPL-SCNC: 3.7 MMOL/L (ref 3.6–5)
SODIUM SERPL-SCNC: 140 MMOL/L (ref 135–145)
WBC # BLD AUTO: 5.6 10^3/UL (ref 4.3–11)

## 2022-08-18 PROCEDURE — 72131 CT LUMBAR SPINE W/O DYE: CPT

## 2022-08-18 PROCEDURE — 72125 CT NECK SPINE W/O DYE: CPT

## 2022-08-18 PROCEDURE — 80048 BASIC METABOLIC PNL TOTAL CA: CPT

## 2022-08-18 PROCEDURE — 72128 CT CHEST SPINE W/O DYE: CPT

## 2022-08-18 PROCEDURE — 85025 COMPLETE CBC W/AUTO DIFF WBC: CPT

## 2022-08-18 PROCEDURE — 36415 COLL VENOUS BLD VENIPUNCTURE: CPT

## 2022-08-18 PROCEDURE — 70450 CT HEAD/BRAIN W/O DYE: CPT

## 2022-08-18 NOTE — DIAGNOSTIC IMAGING REPORT
PROCEDURE: CT thoracic and lumbar spine without contrast.



TECHNIQUE: Multiple contiguous axial images were obtained through

the thoracic and lumbar spine without the use of intravenous

contrast. Sagittal and coronal reformations were then performed.

All CT scans use one or more of the following dose optimizing

techniques: automated exposure control, MA and/or KvP adjustment

based on patient size and exam type or iterative reconstruction. 



INDICATION: Fall with back pain.



FINDINGS: Thoracic and lumbar spinal curvature and alignment are

unremarkable. Vertebral body heights and disc spaces are

maintained. There is no evidence of an acute fracture or

subluxation. There is disc bulging at the L3-L4, L4-L5 and L5-S1

levels with narrowing of L5-S1 disc space and associated endplate

spurring. Degenerative facet arthropathy is also present

bilaterally at L5-S1. There is no evidence of an acute fracture

within the thoracic or lumbar spine. No paraspinous hematoma is

identified.



IMPRESSION: Lower lumbar degenerative disc disease and associated

mild degenerative facet arthropathy however there is no CT

evidence of acute thoracic or lumbar spinal fracture. 



Dictated by: 



  Dictated on workstation # [id number]

## 2022-08-18 NOTE — DIAGNOSTIC IMAGING REPORT
PROCEDURE: CT head and CT cervical spine without contrast.



TECHNIQUE: Multiple contiguous axial images were obtained through

the brain and cervical spine without the use of intravenous

contrast. Sagittal and coronal reformations through the cervical

spine were then performed. Auto Exposure Controls were utilized

during the CT exam to meet ALARA standards for radiation dose

reduction. 



INDICATION: Fall with head and neck pain.



CT HEAD: CT images of the head were obtained. 



FINDINGS: Ventricles and sulci are within normal limits for size.

There is no intracranial hemorrhage identified. There is no

abnormal mass effect or shift of midline structures.



IMPRESSION: Unremarkable CT of the head.



CT CERVICAL SPINE: There is left convexity curvature of the upper

cervical spine with fusion of C2 and C3 vertebral bodies. There

is incomplete closure of the posterior arch at C1. There is

moderate narrowing of the C4-C5 disc space with mild to moderate

lower cervical degenerative facet arthropathy. There is no

evidence of an acute fracture and no cervical spinal hematoma is

identified.



IMPRESSION: Congenital and degenerative findings throughout the

cervical spine without CT evidence of acute cervical spinal

abnormality.



Dictated by: 



  Dictated on workstation # [id number]

## 2022-08-18 NOTE — ED FALL/INJURY
General


Stated Complaint:  FALL/HEAD INJURY


Source:  patient


Exam Limitations:  no limitations





History of Present Illness


Date Seen by Provider:  Aug 18, 2022


Time Seen by Provider:  20:37


Initial Comments


The patient presents to the ER by private conveyance with his significant other 

and chief complaint that just prior to arrival he was working on a lawnmower 

pulled on the cord it broke and he fell over backwards.  He is having some pain 

in his low thoracic back and upper lumbar spine midline.  He is also having a 

little bit of soreness around the insertion of the mastoid process and the right

lateral neck.  He is not having midline neck pain.  He is not having any 

weakness numbness tingling or loss of control of bowel or bladder.  He thinks he

did strike the back of his head.  He thinks he had loss of consciousness only 

momentarily.  He is not on a blood thinner.  He states that he frequently feels 

lightheaded when he stands up from seated or laying position.  He denies being 

on a water pill.  He takes Zoloft and donepezil for dementia.





Allergies and Home Medications


Allergies


Coded Allergies:  


     Penicillins (Unverified  Allergy, Unknown, HIVES, 18)


     nut - unspecified (Unverified  Allergy, Unknown, 18)


   FROM UNCODED ALLERGIES





Patient Home Medication List


Home Medication List Reviewed:  Yes


Albuterol Sulfate (Albuterol Sulfate) 1.25 Mg/3 Ml Vial.neb, 1.25 MG INH UD, 

(Reported)


   Entered as Reported by: GRAY HATFIELD on 22 132


Sertraline HCl (Zoloft) 50 Mg Tablet, 50 MG PO DAILY, (Reported)


   Entered as Reported by: GRAY HATFIELD on 22 1325





Review of Systems


Review of Systems


Constitutional:  No chills, No diaphoresis


Eyes:  Denies Blindness, Denies Blurred Vision


Ears, Nose, Mouth, Throat:  denies ear pain, denies ear discharge


Respiratory:  No cough, No short of breath


Cardiovascular:  No chest pain, No edema


Gastrointestinal:  No abdominal pain, No constipation, No diarrhea


Genitourinary:  No discharge, No dysuria


Musculoskeletal:  see HPI, back pain; No gout, No joint pain; muscle pain, neck 

pain


Skin:  No pruritus, No rash





All Other Systems Reviewed


Negative Unless Noted:  Yes





Past Medical-Social-Family Hx


Patient Social History


Tobacco Use?:  No


Use of E-Cig and/or Vaping dev:  No


Substance use?:  No





Immunizations Up To Date


Tetanus Booster (TDap):  Unknown


PED Vaccines UTD:  Yes


First/Initial COVID19 Vaccinat:  NA


Second COVID19 Vaccination Tai:  NA


Third COVID19 Vaccination Date:  NA





Seasonal Allergies


Seasonal Allergies:  Yes





Past Medical History


Surgeries:  Yes (SKIN CANCERS REMOVED, KNEE )


Appendectomy, Gallbladder


Respiratory:  Yes


Asthma, COPD


Currently Using CPAP:  No


Currently Using BIPAP:  No


Cardiac:  No


Neurological:  No


Reproductive Disorders:  No


Sexually Transmitted Disease:  No


Genitourinary:  No


Kidney Stones


Gastrointestinal:  Yes


Gastroesophageal Reflux, Chronic Constipation


Musculoskeletal:  Yes (r knee pain)


Arthritis, Fractures


Endocrine:  No


HEENT:  No (GLASSES, DENTURES)


Cataract


Cancer:  Yes


Skin


Psychosocial:  Yes (DEMENTIA PER WIFE - NOT DX)


Anxiety, Depression


Integumentary:  No


Blood Disorders:  No


Adverse Reaction/Blood Tranf:  No (N/A)





Family Medical History





Patient reports no known family medical history.





Physical Exam


Vital Signs





Vital Signs - First Documented








 22





 20:52


 


Pulse 83


 


Resp 18


 


B/P (MAP) 142/75 (97)


 


Pulse Ox 97





Capillary Refill :


Height, Weight, BMI


Height: 6'6.00"


Weight: 232lbs. 2.0oz. [id number]; 36.84 BMI


Method:


General Appearance:  WD/WN, no apparent distress


HEENT:  PERRL/EOMI, pharynx normal


Neck:  full range of motion, supple, normal inspection, tender lateral (Right 

side mild without step-off or deformity.)


Cardiovascular:  normal peripheral pulses, regular rate, rhythm


Respiratory:  lungs clear, normal breath sounds, no respiratory distress, no 

accessory muscle use


Peripheral Pulses:  2+ Dorsalis Pedis (R), 2+ Left Dors-Pedis (L), 2+ Radial 

Pulses (R), 2+ Radial Pulses (L)


Back:  normal inspection, no CVA tenderness, vertebral tenderness (Midline 

vertebral tenderness in the low thoracic high lumbar region)


Extremities:  normal range of motion, non-tender, normal capillary refill


Neurologic/Psychiatric:  CNs II-XII nml as tested, no motor/sensory deficits, 

alert, normal mood/affect, oriented x 3


Skin:  normal color, warm/dry





Dania Coma Score


Best Eye Response:  (4) Open Spontaneously


Best Verbal Response:  (5) Oriented


Best Motor Response:  (6) Obeys Commands


Dania Total:  15





Progress/Results/Core Measures


Results/Orders


Lab Results





Laboratory Tests








Test


 22


21:05 Range/Units


 


 


White Blood Count


 5.6 


 4.3-11.0


10^3/uL


 


Red Blood Count


 3.48 L


 4.30-5.52


10^6/uL


 


Hemoglobin 11.9 L 13.3-17.7  g/dL


 


Hematocrit 35 L 40-54  %


 


Mean Corpuscular Volume 100 H 80-99  fL


 


Mean Corpuscular Hemoglobin 34  25-34  pg


 


Mean Corpuscular Hemoglobin


Concent 34 


 32-36  g/dL





 


Red Cell Distribution Width 13.7  10.0-14.5  %


 


Platelet Count


 194 


 130-400


10^3/uL


 


Mean Platelet Volume 8.9 L 9.0-12.2  fL


 


Immature Granulocyte % (Auto) 0   %


 


Neutrophils (%) (Auto) 57  42-75  %


 


Lymphocytes (%) (Auto) 29  12-44  %


 


Monocytes (%) (Auto) 11  0-12  %


 


Eosinophils (%) (Auto) 2  0-10  %


 


Basophils (%) (Auto) 1  0-10  %


 


Neutrophils # (Auto)


 3.2 


 1.8-7.8


10^3/uL


 


Lymphocytes # (Auto)


 1.6 


 1.0-4.0


10^3/uL


 


Monocytes # (Auto)


 0.6 


 0.0-1.0


10^3/uL


 


Eosinophils # (Auto)


 0.1 


 0.0-0.3


10^3/uL


 


Basophils # (Auto)


 0.0 


 0.0-0.1


10^3/uL


 


Immature Granulocyte # (Auto)


 0.0 


 0.0-0.1


10^3/uL


 


Sodium Level 140  135-145  MMOL/L


 


Potassium Level 3.7  3.6-5.0  MMOL/L


 


Chloride Level 108 H   MMOL/L


 


Carbon Dioxide Level 20 L 21-32  MMOL/L


 


Anion Gap 12  5-14  MMOL/L


 


Blood Urea Nitrogen 10  7-18  MG/DL


 


Creatinine


 1.12 


 0.60-1.30


MG/DL


 


Estimat Glomerular Filtration


Rate 66 


  





 


BUN/Creatinine Ratio 9   


 


Glucose Level 130 H   MG/DL


 


Calcium Level 9.2  8.5-10.1  MG/DL








My Orders





Orders - BERNY VELASCO


Ct Head/Cervical Spine Wo (22 20:42)


Ct Thoracic/Lumbar Spine Wo (22 20:42)


Orthostatic Vital Signs (Adult (22 20:42)


Ed Iv/Invasive Line Start (22 20:46)


Lactated Ringers (Lr 1000 Ml Iv Solution (22 21:00)


Cbc With Automated Diff (22 20:46)


Basic Metabolic Panel (22 20:46)





Medications Given in ED





Current Medications








 Medications  Dose


 Ordered  Sig/Campbell


 Route  Start Time


 Stop Time Status Last Admin


Dose Admin


 


 Lactated Ringer's  1,000 ml @ 


 0 mls/hr  Q0M ONCE


 IV  22 21:00


 22 21:01 DC 22 21:29


0 MLS/HR








Vital Signs/I&O











 22





 20:52 20:52 20:55 20:56


 


Pulse 83 81 75 89


 


Resp  18  


 


B/P (MAP) 142/75 (97) 145/87 (106)  





   137/79 (98) 112/63 (79)


 


Pulse Ox  97  











Progress


Progress Note :  


   Time:  20:49


Progress Note


CT of the head, cervical, thoracic, lumbar spine.  He declined anything for pain

at this time.  We will go ahead and check some basic labs including a CBC, BMP 

looking for anemia.  His orthostatics were positive so we will give him a liter 

of lactated Ringer's and reexamine after his imaging.





Diagnostic Imaging





   Diagonstic Imaging:  CT


   Plain Films/CT/US/NM/MRI:  c-spine, head


Comments


                 [facility].


                                [place], [place]





NAME:   NYDIA GRANADO


MED REC#:   [id number]


ACCOUNT#:   [id number]


PT STATUS:   REG ER


:   1940


PHYSICIAN:   BERNY VELASCO MD


ADMIT DATE:   22/ER


                                   ***Draft***


Date of Exam:22





CT HEAD/CERVICAL SPINE WO








PROCEDURE: CT head and CT cervical spine without contrast.





TECHNIQUE: Multiple contiguous axial images were obtained through


the brain and cervical spine without the use of intravenous


contrast. Sagittal and coronal reformations through the cervical


spine were then performed. Auto Exposure Controls were utilized


during the CT exam to meet ALARA standards for radiation dose


reduction. 





INDICATION: Fall with head and neck pain.





CT HEAD: CT images of the head were obtained. 





FINDINGS: Ventricles and sulci are within normal limits for size.


There is no intracranial hemorrhage identified. There is no


abnormal mass effect or shift of midline structures.





IMPRESSION: Unremarkable CT of the head.





CT CERVICAL SPINE: There is left convexity curvature of the upper


cervical spine with fusion of C2 and C3 vertebral bodies. There


is incomplete closure of the posterior arch at C1. There is


moderate narrowing of the C4-C5 disc space with mild to moderate


lower cervical degenerative facet arthropathy. There is no


evidence of an acute fracture and no cervical spinal hematoma is


identified.





IMPRESSION: Congenital and degenerative findings throughout the


cervical spine without CT evidence of acute cervical spinal


abnormality.





  Dictated on workstation # [id number]








Dict:   22


Trans:   22


[facility] [id number]





Interpreted by:     HECTOR PERALES MD


Electronically signed by:


   Reviewed:  Reviewed by Me








   Diagonstic Imaging:  CT


   Plain Films/CT/US/NM/MRI:  other (Thoracolumbar spine)


Comments


                 [facility] [place], [place]





NAME:   NYDIA GRANADO


MED REC#:   [id number]


ACCOUNT#:   [id number]


PT STATUS:   REG ER


:   1940


PHYSICIAN:   BERNY VELASCO MD


ADMIT DATE:   22/ER


                                   ***Draft***


Date of Exam:22





CT THORACIC/LUMBAR SPINE WO








PROCEDURE: CT thoracic and lumbar spine without contrast.





TECHNIQUE: Multiple contiguous axial images were obtained through


the thoracic and lumbar spine without the use of intravenous


contrast. Sagittal and coronal reformations were then performed.


All CT scans use one or more of the following dose optimizing


techniques: automated exposure control, MA and/or KvP adjustment


based on patient size and exam type or iterative reconstruction. 





INDICATION: Fall with back pain.





FINDINGS: Thoracic and lumbar spinal curvature and alignment are


unremarkable. Vertebral body heights and disc spaces are


maintained. There is no evidence of an acute fracture or


subluxation. There is disc bulging at the L3-L4, L4-L5 and L5-S1


levels with narrowing of L5-S1 disc space and associated endplate


spurring. Degenerative facet arthropathy is also present


bilaterally at L5-S1. There is no evidence of an acute fracture


within the thoracic or lumbar spine. No paraspinous hematoma is


identified.





IMPRESSION: Lower lumbar degenerative disc disease and associated


mild degenerative facet arthropathy however there is no CT


evidence of acute thoracic or lumbar spinal fracture. 





  Dictated on workstation # [id number]








Dict:   22


Trans:   22


[facility] [id number]





Interpreted by:     HECTOR PERALES MD


Electronically signed by:


   Reviewed:  Reviewed by Me





Departure


Impression





   Primary Impression:  


   Fall


   Qualified Codes:  W19.XXXA - Unspecified fall, initial encounter


   Additional Impressions:  


   Cervical paraspinous muscle spasm


   Orthostasis


Disposition:   HOME, SELF-CARE


Condition:  Stable





Departure-Patient Inst.


Decision time for Depature:  21:57


Referrals:  


YONY ALCALA MD (PCP/Family)


Primary Care Physician


Patient Instructions:  Neck Sprain (DC)





Add. Discharge Instructions:  


Ice 20 minutes on every 2 hours while awake for the first 2 days to reduce 

swelling and pain in your neck and back.


Topical creams such as icy hot, Biofreeze, capsaicin oil, blue emu are all 

recommended.  You may also use Salonpas or lidocaine patches.


Tylenol 1000 mg every 8 hours needed for pain.


Ibuprofen 600 mg every 8 hours as needed for pain.


If you are still having significant symptoms 1 to 2 weeks out then call your 

primary care doctor and discuss a referral to physical therapy.


You may also approach physical therapy directly by calling for a no upfront cost

evaluation for treatment by calling [facility] physical therapy at [phone].





Copy


Copies To 1:   YONY ALCALA MD, TITUS J                 Aug 18, 2022 20:51

## 2022-09-02 ENCOUNTER — HOSPITAL ENCOUNTER (OUTPATIENT)
Dept: HOSPITAL 75 - CARD | Age: 82
End: 2022-09-02
Attending: INTERNAL MEDICINE
Payer: MEDICARE

## 2022-09-02 DIAGNOSIS — I35.0: Primary | ICD-10-CM

## 2022-09-02 DIAGNOSIS — I51.7: ICD-10-CM

## 2022-09-02 PROCEDURE — 93306 TTE W/DOPPLER COMPLETE: CPT

## 2023-04-25 ENCOUNTER — HOSPITAL ENCOUNTER (EMERGENCY)
Dept: HOSPITAL 75 - ER | Age: 83
Discharge: HOME | End: 2023-04-25
Payer: MEDICARE

## 2023-04-25 VITALS — SYSTOLIC BLOOD PRESSURE: 133 MMHG | DIASTOLIC BLOOD PRESSURE: 94 MMHG

## 2023-04-25 VITALS — BODY MASS INDEX: 25.69 KG/M2 | WEIGHT: 219.8 LBS | HEIGHT: 77.56 IN

## 2023-04-25 DIAGNOSIS — S09.90XA: Primary | ICD-10-CM

## 2023-04-25 DIAGNOSIS — N40.1: ICD-10-CM

## 2023-04-25 DIAGNOSIS — R29.6: ICD-10-CM
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
## 2023-04-25 DIAGNOSIS — M25.511: ICD-10-CM

## 2023-04-25 DIAGNOSIS — Z96.659: ICD-10-CM

## 2023-04-25 DIAGNOSIS — R33.8: ICD-10-CM

## 2023-04-25 LAB
APTT PPP: YELLOW S
BACTERIA #/AREA URNS HPF: (no result) /HPF
BASOPHILS # BLD AUTO: 0 10^3/UL (ref 0–0.1)
BASOPHILS NFR BLD AUTO: 1 % (ref 0–10)
BILIRUB UR QL STRIP: NEGATIVE
BUN/CREAT SERPL: 11
CALCIUM SERPL-MCNC: 9 MG/DL (ref 8.5–10.1)
CHLORIDE SERPL-SCNC: 108 MMOL/L (ref 98–107)
CO2 SERPL-SCNC: 19 MMOL/L (ref 21–32)
CREAT SERPL-MCNC: 0.94 MG/DL (ref 0.6–1.3)
EOSINOPHIL # BLD AUTO: 0 10^3/UL (ref 0–0.3)
EOSINOPHIL NFR BLD AUTO: 0 % (ref 0–10)
FIBRINOGEN PPP-MCNC: CLEAR MG/DL
GFR SERPLBLD BASED ON 1.73 SQ M-ARVRAT: 81 ML/MIN
GLUCOSE SERPL-MCNC: 157 MG/DL (ref 70–105)
GLUCOSE UR STRIP-MCNC: NEGATIVE MG/DL
HCT VFR BLD CALC: 36 % (ref 40–54)
HGB BLD-MCNC: 12.2 G/DL (ref 13.3–17.7)
KETONES UR QL STRIP: (no result)
LEUKOCYTE ESTERASE UR QL STRIP: NEGATIVE
LYMPHOCYTES # BLD AUTO: 0.9 10^3/UL (ref 1–4)
LYMPHOCYTES NFR BLD AUTO: 13 % (ref 12–44)
MAGNESIUM SERPL-MCNC: 2.1 MG/DL (ref 1.6–2.4)
MANUAL DIFFERENTIAL PERFORMED BLD QL: NO
MCH RBC QN AUTO: 35 PG (ref 25–34)
MCHC RBC AUTO-ENTMCNC: 34 G/DL (ref 32–36)
MCV RBC AUTO: 103 FL (ref 80–99)
MONOCYTES # BLD AUTO: 0.7 10^3/UL (ref 0–1)
MONOCYTES NFR BLD AUTO: 9 % (ref 0–12)
NEUTROPHILS # BLD AUTO: 5.6 10^3/UL (ref 1.8–7.8)
NEUTROPHILS NFR BLD AUTO: 77 % (ref 42–75)
NITRITE UR QL STRIP: NEGATIVE
PH UR STRIP: 6 [PH] (ref 5–9)
PLATELET # BLD: 187 10^3/UL (ref 130–400)
PMV BLD AUTO: 9.4 FL (ref 9–12.2)
POTASSIUM SERPL-SCNC: 4.1 MMOL/L (ref 3.6–5)
PROT UR QL STRIP: NEGATIVE
RBC #/AREA URNS HPF: (no result) /HPF
SODIUM SERPL-SCNC: 138 MMOL/L (ref 135–145)
SP GR UR STRIP: 1.02 (ref 1.02–1.02)
SQUAMOUS #/AREA URNS HPF: (no result) /HPF
WBC # BLD AUTO: 7.3 10^3/UL (ref 4.3–11)
WBC #/AREA URNS HPF: (no result) /HPF

## 2023-04-25 PROCEDURE — 80048 BASIC METABOLIC PNL TOTAL CA: CPT

## 2023-04-25 PROCEDURE — 73030 X-RAY EXAM OF SHOULDER: CPT

## 2023-04-25 PROCEDURE — 81000 URINALYSIS NONAUTO W/SCOPE: CPT

## 2023-04-25 PROCEDURE — 70450 CT HEAD/BRAIN W/O DYE: CPT

## 2023-04-25 PROCEDURE — 83735 ASSAY OF MAGNESIUM: CPT

## 2023-04-25 PROCEDURE — 36415 COLL VENOUS BLD VENIPUNCTURE: CPT

## 2023-04-25 PROCEDURE — 85025 COMPLETE CBC W/AUTO DIFF WBC: CPT

## 2023-04-25 NOTE — DIAGNOSTIC IMAGING REPORT
PROCEDURE: CT head without contrast.



TECHNIQUE: Multiple contiguous axial images were obtained through

the brain without the use of intravenous contrast. Auto Exposure

Controls were utilized during the CT exam to meet ALARA standards

for radiation dose reduction. 



INDICATION:  Fall, head injury, pain.



COMPARISON: 08/18/2022.



FINDINGS: Mild atrophy. No intracranial hemorrhage. No

intracranial mass, mass effect, midline shift, herniation,

hydrocephalus, or extra-axial fluid collection. Periventricular

and subcortical white matter hypodensities are again identified,

most consistent with mild background chronic small vessel white

matter ischemic disease. No definite CT evidence of an acute

ischemic infarction. The orbits are unremarkable. Background

vascular calcifications. The paranasal sinuses are clear. The

calvarium and extracalvarial soft tissues are unremarkable.



IMPRESSION: Stable-appearing examination without acute

intracranial abnormality.



Mild atrophy with associated mild background chronic small vessel

white matter ischemic disease.



Dictated by: 



  Dictated on workstation # [id number]

## 2023-04-25 NOTE — DIAGNOSTIC IMAGING REPORT
Indication:  Shoulder pain.



Findings:  Three-view right shoulder performed, showed no

fracture, dislocation or acute articular irregularity. 



Impression:  Unremarkable three-view right shoulder series.



Dictated by: 



  Dictated on workstation # [id number]

## 2023-04-25 NOTE — ED FALL/INJURY
General


Chief Complaint:  Upper Extremity


Stated Complaint:  FALL | RT SHOULDER/HEAD PAIN


Nursing Triage Note:  


PT PRESENTS TO ED ACCOMPANIED BY WIFE WITH COMPLAINTS OF R SHOULDER PAIN AFTER 


FALLING ON SATURDAY AND . PT WIFE REPORTS HE HAS HAD AN INCREASE FALLS 


RECENTLY BUT HAS GRADUALLY GOTTEN WORSE WITH INCONTINENCE AND GETTING AROUND. PT




WIFE REPROTS SHE STRUGGLES TO HELP HIM AT HOME AND THINKS HE NEEDS SOME PHYSICAL




THERAPY.


Source:  patient, family


Exam Limitations:  other (Dementia)





History of Present Illness


Date Seen by Provider:  2023


Time Seen by Provider:  09:30


Initial Comments


This 82-year-old gentleman presents to the emergency room accompanied by his 

wife with complaints of right shoulder pain and head pain after having falls on 

Saturday and ,  and .  He had a knee surgery about 5 years ago 

and has not been able to walk normally since then.  He uses crutches and a 

walker.  He is very unsteady and has poor balance.  This has progressed with his

dementia.  He lives at home with his wife.  He has significant urinary 

incontinence.  He has had prostatic hypertrophy with PSA of 9 previously.  He 

elected not to have any procedures performed for further evaluation.  His right 

shoulder pain is minimal at rest but much exacerbated with movement.  Wife 

reports he occasionally gets lightheaded or dizzy when bending over.  Vision is 

also poor according to his wife.  Patient and wife are co-historians during the 

interview.





Allergies and Home Medications


Allergies


Coded Allergies:  


     Penicillins (Unverified  Allergy, Unknown, HIVES, 18)


     nut - unspecified (Unverified  Allergy, Unknown, 18)


   FROM UNCODED ALLERGIES





Patient Home Medication List


Home Medication List Reviewed:  Yes


Albuterol Sulfate (Albuterol Sulfate) 1.25 Mg/3 Ml Vial.neb, 1.25 MG INH UD, 

(Reported)


   Entered as Reported by: GRAY HATFIELD on 22 1325


Sertraline HCl (Zoloft) 50 Mg Tablet, 50 MG PO DAILY, (Reported)


   Entered as Reported by: GRAY HATFIELD on 22 1325


Tamsulosin HCl (Flomax) 0.4 Mg Cap, 0.4 MG PO DAILY


   Prescribed by: CHADWICK LYLES on 23 1341





Review of Systems


Review of Systems


Constitutional:  no symptoms reported


Eyes:  See HPI


Ears, Nose, Mouth, Throat:  no symptoms reported


Respiratory:  no symptoms reported


Cardiovascular:  see HPI


Gastrointestinal:  no symptoms reported


Genitourinary:  no symptoms reported


Musculoskeletal:  see HPI


Skin:  no symptoms reported


Psychiatric/Neurological:  See HPI





Past Medical-Social-Family Hx


Patient Social History


Tobacco Use?:  No


Substance use?:  No


Alcohol Use?:  Yes


Alcohol type:  Beer


Alcohol Frequency:  Once in a while


Pt feels they are or have been:  No





Immunizations Up To Date


Tetanus Booster (TDap):  Unknown


PED Vaccines UTD:  Yes


First/Initial COVID19 Vaccinat:  NA


Second COVID19 Vaccination Tai:  NA


Third COVID19 Vaccination Date:  NA





Seasonal Allergies


Seasonal Allergies:  Yes





Past Medical History


Surgery/Hospitalization HX:  


DEMENTIA


Surgeries:  Yes (SKIN CANCERS REMOVED, KNEE )


Appendectomy, Gallbladder, Orthopedic (knee)


Respiratory:  Yes


Asthma, COPD


Currently Using CPAP:  No


Currently Using BIPAP:  No


Cardiac:  Yes (Grade 1 diastolic dysfunction)


Valvular Heart Disease


Neurological:  No


Reproductive Disorders:  No


Sexually Transmitted Disease:  No


Genitourinary:  Yes


Prostate Problems (Urinary incontinence with high PSA), Kidney Stones


Gastrointestinal:  Yes


Gastroesophageal Reflux, Chronic Constipation


Musculoskeletal:  Yes (r knee pain)


Arthritis, Fractures


Endocrine:  No


HEENT:  Yes (GLASSES, DENTURES)


Cataract


Cancer:  Yes


Skin


Psychosocial:  Yes (DEMENTIA PER WIFE - NOT DX)


Anxiety, Depression


Integumentary:  No


Blood Disorders:  No


Adverse Reaction/Blood Tranf:  No (N/A)





Family Medical History





Patient reports no known family medical history.





Physical Exam


Vital Signs





Vital Signs - First Documented








 23





 09:33 13:46


 


Temp 36.3 


 


Pulse 93 


 


Resp 16 


 


B/P (MAP) 108/89 (95) 


 


Pulse Ox 98 


 


O2 Delivery  Room Air





Capillary Refill : Less Than 3 Seconds


Height, Weight, BMI


Height: 6'6.00"


Weight: 232lbs. 2.0oz. [id number]; 25.00 BMI


Method:


General Appearance:  WD/WN, no apparent distress


HEENT:  PERRL/EOMI, normal ENT inspection


Neck:  non-tender, normal inspection


Cardiovascular:  regular rate, rhythm, no edema, systolic murmur


Respiratory:  lungs clear, normal breath sounds, no respiratory distress


Peripheral Pulses:  2+ Radial Pulses (R)


Gastrointestinal:  non tender, soft; No distended


Extremities:  normal inspection, no pedal edema, other (Right shoulder 

tenderness and right shoulder pain with range of motion.  Distal exam 

unremarkable.  Strong radial pulse and .)


Neurologic/Psychiatric:  no motor/sensory deficits, alert, normal mood/affect, 

other (Conversational, jovial)


Skin:  normal color, warm/dry





[place] Coma Score


Best Eye Response:  (4) Open Spontaneously


Best Verbal Response:  (5) Oriented


Best Motor Response:  (6) Obeys Commands


[place] Total:  15





Progress/Results/Core Measures


Results/Orders


Lab Results





Laboratory Tests








Test


 23


09:54 23


10:58 Range/Units


 


 


White Blood Count


 7.3 


 


 4.3-11.0


10^3/uL


 


Red Blood Count


 3.49 L


 


 4.30-5.52


10^6/uL


 


Hemoglobin 12.2 L  13.3-17.7  g/dL


 


Hematocrit 36 L  40-54  %


 


Mean Corpuscular Volume 103 H  80-99  fL


 


Mean Corpuscular Hemoglobin 35 H  25-34  pg


 


Mean Corpuscular Hemoglobin


Concent 34 


 


 32-36  g/dL





 


Red Cell Distribution Width 13.2   10.0-14.5  %


 


Platelet Count


 187 


 


 130-400


10^3/uL


 


Mean Platelet Volume 9.4   9.0-12.2  fL


 


Immature Granulocyte % (Auto) 0    %


 


Neutrophils (%) (Auto) 77 H  42-75  %


 


Lymphocytes (%) (Auto) 13   12-44  %


 


Monocytes (%) (Auto) 9   0-12  %


 


Eosinophils (%) (Auto) 0   0-10  %


 


Basophils (%) (Auto) 1   0-10  %


 


Neutrophils # (Auto)


 5.6 


 


 1.8-7.8


10^3/uL


 


Lymphocytes # (Auto)


 0.9 L


 


 1.0-4.0


10^3/uL


 


Monocytes # (Auto)


 0.7 


 


 0.0-1.0


10^3/uL


 


Eosinophils # (Auto)


 0.0 


 


 0.0-0.3


10^3/uL


 


Basophils # (Auto)


 0.0 


 


 0.0-0.1


10^3/uL


 


Immature Granulocyte # (Auto)


 0.0 


 


 0.0-0.1


10^3/uL


 


Sodium Level 138   135-145  MMOL/L


 


Potassium Level 4.1   3.6-5.0  MMOL/L


 


Chloride Level 108 H    MMOL/L


 


Carbon Dioxide Level 19 L  21-32  MMOL/L


 


Anion Gap 11   5-14  MMOL/L


 


Blood Urea Nitrogen 10   7-18  MG/DL


 


Creatinine


 0.94 


 


 0.60-1.30


MG/DL


 


Estimat Glomerular Filtration


Rate 81 


 


  





 


BUN/Creatinine Ratio 11    


 


Glucose Level 157 H    MG/DL


 


Calcium Level 9.0   8.5-10.1  MG/DL


 


Magnesium Level 2.1   1.6-2.4  MG/DL


 


Urine Color  YELLOW   


 


Urine Clarity  CLEAR   


 


Urine pH  6.0  5-9  


 


Urine Specific Gravity  1.025 H 1.016-1.022  


 


Urine Protein  NEGATIVE  NEGATIVE  


 


Urine Glucose (UA)  NEGATIVE  NEGATIVE  


 


Urine Ketones  1+ H NEGATIVE  


 


Urine Nitrite  NEGATIVE  NEGATIVE  


 


Urine Bilirubin  NEGATIVE  NEGATIVE  


 


Urine Urobilinogen  1.0  < = 1.0  MG/DL


 


Urine Leukocyte Esterase  NEGATIVE  NEGATIVE  


 


Urine RBC (Auto)  1+ H NEGATIVE  


 


Urine RBC  5-10 H  /HPF


 


Urine WBC  RARE   /HPF


 


Urine Squamous Epithelial


Cells 


 RARE 


  /HPF





 


Urine Crystals  NONE   /LPF


 


Urine Bacteria  TRACE   /HPF


 


Urine Casts  NONE   /LPF


 


Urine Mucus  SMALL H  /LPF


 


Urine Culture Indicated  NO   








My Orders





Orders - CHADWICK NELSON MD


Basic Metabolic Panel (23 09:40)


Cbc With Automated Diff (23 09:40)


Magnesium (23 09:40)


Ua Culture If Indicated (23 09:40)


Ct Head Wo (23 09:40)


Shoulder, Right, 3 Views (23 09:40)


Bladder Scan (23 09:40)


Sheppard Cath (23 11:02)





Vital Signs/I&O











 23





 09:33 13:46


 


Temp 36.3 


 


Pulse 93 80


 


Resp 16 18


 


B/P (MAP) 108/89 (95) 133/94


 


Pulse Ox 98 97


 


O2 Delivery  Room Air














Blood Pressure Mean:                    95











Progress


Progress Note #1:  


   Time:  09:50


Progress Note


Patient and wife interviewed and patient examined.  He has some tenderness in 

the shoulder joint region on the right and pain with movement.  Distal exam is 

unremarkable.  Labs will be assessed to evaluate his weakness, balance issues, 

and frequent falls.  CT of the head will be obtained as he still has had pain 

from his recent fall.  Patient complains of urinary incontinence with history of

markedly elevated PSA.  Bladder scan and urinalysis will be obtained.  Further 

treatment and evaluation will be based on results of the studies.


Progress Note #2:  


Progress Note


Labs were reviewed and interpreted in their entirety by me.  CBC, BMP, 

magnesium, and urinalysis demonstrated no critical abnormalities that required 

treatment in the ER.  X-ray report of the right shoulder and CT report of the 

head were reviewed.  No serious injuries were identified on imaging studies per 

radiologist's reports.  Patient described that urinary incontinence and history 

of markedly elevated PSA.  Bladder scan was performed and revealed approximately

600 mL in the bladder.  Patient attempted to void afterward and was only able to

void about 75 mL.  Catheterization and placement of indwelling Sheppard was 

recommended.  Patient adamantly refused.  I did explain to him that he could ha

ve worsening of bladder problems and develop complete obstruction with severe 

pain and distress.  He is willing to take his chances with these progressive 

problems.  He seems to express good understanding of my concerns but declines 

the catheter.  He is willing to start Flomax.  Follow-up with his primary care 

provider and a urologist was recommended.  See discharge instructions for 

further discussion.





Patient and wife also expressed interest in being evaluated for the acute rehab 

unit given his significant problems with ambulation and balance.  They indicated

that staff from the clinic suggested they inquire about inpatient rehab in the 

ER.  We were able to contact the screening staff for the inpatient rehab unit.  

Unfortunately, the patient did not meet criteria for admission to the rehab 

unit.  He was advised to discuss outpatient opportunities with his primary care 

provider.  Such opportunities might include physical therapy and occupational 

therapy.





Diagnostic Imaging





   Diagonstic Imaging:  Xray


   Plain Films/CT/US/NM/MRI:  other (Right shoulder)


Comments


NAME:   NYDIA GRANADO


MED REC#:   [id number]


ACCOUNT#:   [id number]


PT STATUS:   DEP ER


:   1940


PHYSICIAN:   CHADWICK NELSON MD


ADMIT DATE:   23/ER


                                  ***Signed***


Date of Exam:23





SHOULDER, RIGHT, 3 VIEWS





Indication:  Shoulder pain.





Findings:  Three-view right shoulder performed, showed no


fracture, dislocation or acute articular irregularity. 





Impression:  Unremarkable three-view right shoulder series.





Dictated by: 





  Dictated on workstation # [id number]





Dict:   23 1027


Trans:   23 1559


[facility] [id number]





Interpreted by:     HECTOR OLVERA


Electronically signed by: HECTOR OLVERA 23 1559








   Diagonstic Imaging:  CT


   Plain Films/CT/US/NM/MRI:  head


Comments


NAME:   NYDIA GRANADO


MED REC#:   [id number]


ACCOUNT#:   [id number]


PT STATUS:   REG ER


:   1940


PHYSICIAN:   CHADWICK NELSON MD


ADMIT DATE:   23/ER


                                  ***Signed***


Date of Exam:23





CT HEAD WO





PROCEDURE: CT head without contrast.





TECHNIQUE: Multiple contiguous axial images were obtained through


the brain without the use of intravenous contrast. Auto Exposure


Controls were utilized during the CT exam to meet ALARA standards


for radiation dose reduction. 





INDICATION:  Fall, head injury, pain.





COMPARISON: 2022.





FINDINGS: Mild atrophy. No intracranial hemorrhage. No


intracranial mass, mass effect, midline shift, herniation,


hydrocephalus, or extra-axial fluid collection. Periventricular


and subcortical white matter hypodensities are again identified,


most consistent with mild background chronic small vessel white


matter ischemic disease. No definite CT evidence of an acute


ischemic infarction. The orbits are unremarkable. Background


vascular calcifications. The paranasal sinuses are clear. The


calvarium and extracalvarial soft tissues are unremarkable.





IMPRESSION: Stable-appearing examination without acute


intracranial abnormality.





Mild atrophy with associated mild background chronic small vessel


white matter ischemic disease.





Dictated by: 





  Dictated on workstation # [id number]





Dict:   23 1010


Trans:   23 1333


[facility] [id number]





Interpreted by:     CAMPBELL HARLEY MD


Electronically signed by: CAMPBELL HARLEY MD 23 1333





Departure


Impression





   Primary Impression:  


   Fall on same level


   Qualified Codes:  W18.30XA - Fall on same level, unspecified, initial 

   encounter


   Additional Impressions:  


   Right shoulder pain


   Qualified Codes:  M25.511 - Pain in right shoulder


   Minor head injury


   Qualified Codes:  S09.90XA - Unspecified injury of head, initial encounter


   Urinary retention


Disposition:  01 HOME, SELF-CARE


Condition:  Stable





Departure-Patient Inst.


Decision time for Depature:  13:37


Referrals:  


YONY ALCALA MD (PCP/Family)


Primary Care Physician


Patient Instructions:  Urinary Retention





Add. Discharge Instructions:  


You need to follow-up with your primary care provider as soon as possible.  

Please call today to make an appointment.  You need referral to a new urologist 

as well.  Urinary catheter was recommended due to your significant urinary 

retention.  Use Flomax to try to help increase urine flow and improve bladder 

emptying.  If this is not successful in reducing your urinary retention, you may

need a temporary Sheppard catheter placement and/or additional medications.  Return

to the emergency room promptly if you are unable to urinate.


For pain you may take Tylenol (acetaminophen) up to 1000 mg every 6 hours as 

needed.  Add ibuprofen sparingly up to 400 mg 3 or 4 times a day if needed for 

pain not controlled by Tylenol.  Icing affected areas in 20-minute intervals may

also be helpful.


Discussed your shoulder injury with your primary care provider.  If this does 

not improve soon, you may need further evaluation for additional injuries such 

as a rotator cuff injury.


Also discussed the possibility of outpatient therapy services such as physical 

therapy and occupational therapy to improve strength, balance, and functional 

capacity.


Return to the emergency room if you have worsening symptoms despite following 

these instructions.





All discharge instructions reviewed with patient and/or family. Voiced 

understanding.


Scripts


Tamsulosin HCl (Flomax) 0.4 Mg Cap


0.4 MG PO DAILY, #30 CAP


   Prov: CHADWICK NELSON MD         23





Copy


Copies To 1:   YONY ALCALA MD, JOSHUA T MD        2023 09:49

## 2023-07-28 ENCOUNTER — HOSPITAL ENCOUNTER (OUTPATIENT)
Dept: HOSPITAL 75 - RAD | Age: 83
End: 2023-07-28
Attending: INTERNAL MEDICINE
Payer: MEDICARE

## 2023-07-28 DIAGNOSIS — R07.81: Primary | ICD-10-CM

## 2023-07-28 PROCEDURE — 71100 X-RAY EXAM RIBS UNI 2 VIEWS: CPT

## 2023-07-28 NOTE — DIAGNOSTIC IMAGING REPORT
EXAMINATION: Right ribs two views.



HISTORY: Chest injury.



COMPARISON: 10/31/2018.



FINDINGS:



No right-sided rib fracture is seen. Right lung is clear. No

pneumothorax.



IMPRESSION:



1. No fracture in the right ribs.



Dictated by: 



  Dictated on workstation # [id number]

## 2023-09-12 ENCOUNTER — HOSPITAL ENCOUNTER (EMERGENCY)
Dept: HOSPITAL 75 - ER | Age: 83
LOS: 1 days | Discharge: HOME | End: 2023-09-13
Payer: MEDICARE

## 2023-09-12 VITALS — WEIGHT: 219.8 LBS | HEIGHT: 76.97 IN | BODY MASS INDEX: 25.95 KG/M2
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
## 2023-09-12 DIAGNOSIS — S01.81XA: ICD-10-CM

## 2023-09-12 DIAGNOSIS — S06.0XAA: Primary | ICD-10-CM

## 2023-09-12 PROCEDURE — 70450 CT HEAD/BRAIN W/O DYE: CPT

## 2023-09-12 PROCEDURE — 71045 X-RAY EXAM CHEST 1 VIEW: CPT

## 2023-09-12 PROCEDURE — 70486 CT MAXILLOFACIAL W/O DYE: CPT

## 2023-09-12 PROCEDURE — 72125 CT NECK SPINE W/O DYE: CPT

## 2023-09-12 NOTE — ED FALL/INJURY
General


Chief Complaint:  Trauma-Non Activation


Stated Complaint:  FALL


Nursing Triage Note:  


FALL


Source:  patient


Exam Limitations:  no limitations


 (SURJIT PATEL)





History of Present Illness


Date Seen by Provider:  Sep 12, 2023


Time Seen by Provider:  22:21


Initial Comments


Patient is a 82-year-old male who presents to ED with head injury.  Patient 

states he fell 20 minutes before arrival.  Fell off the front porch while going 

down the steps.  Patient states he missed one of the post tripped fell hitting 

his forehead on the bricks around his house.  No loss of conscious or on blood 

thinners.  Had bleeding to his forehead with significant swelling.  EMS was 

contacted.  Patient was placed in a c-collar.  Complaining of neck pain to EMS 

but denies of any neck pain on arrival.  Bleeding controlled.  Reports mild 

headache.  Refusing anything for pain.  Denies of any numbness and tingling in 

his upper or lower extremities.  Denies any hip pain, lower extremity pain, 

chest pain,  shortness of breath, or abdominal pain.  Denies hitting his chest. 

Patient states he feels a small little area of discomfort to his right lower 

lateral abdomen.  Patient denies shortness of breath, vomiting, diarrhea, 

worsening head pain.  Patient is up-to-date on his tetanus in the past 2 years.


 (SURJIT PATEL)





Allergies and Home Medications


Allergies


Coded Allergies:  


     Penicillins (Unverified  Allergy, Unknown, HIVES, 12/21/18)


     nut - unspecified (Unverified  Allergy, Unknown, 12/26/18)


   FROM UNCODED ALLERGIES





Patient Home Medication List


Home Medication List Reviewed:  Yes


 (SURJIT PTAEL)


Albuterol Sulfate (Albuterol Sulfate) 1.25 Mg/3 Ml Vial.neb, 1.25 MG INH UD, 

(Reported)


   Entered as Reported by: GRAY HATFIELD on 7/8/22 1325


Sertraline HCl (Zoloft) 50 Mg Tablet, 50 MG PO DAILY, (Reported)


   Entered as Reported by: GRAY HATFIELD on 7/8/22 1325


Tamsulosin HCl (Flomax) 0.4 Mg Cap, 0.4 MG PO DAILY


   Prescribed by: CHADWICK LYLES on 4/25/23 1341





Review of Systems


Review of Systems


Constitutional:  No chills, No diaphoresis


Eyes:  Denies Drainage, Denies Decreased Acuity


Ears, Nose, Mouth, Throat:  denies ear pain, denies ear discharge


Gastrointestinal:  No abdominal pain, No diarrhea, No nausea, No vomiting


Genitourinary:  No decreased output, No discharge


Musculoskeletal:  No back pain; joint pain; No joint swelling, No muscle pain


Skin:  change in color (SURJIT PATEL)





All Other Systems Reviewed


Negative Unless Noted:  Yes


 (SURJIT PATEL)





Past Medical-Social-Family Hx


Patient Social History


Tobacco Use?:  No


Substance use?:  No


Alcohol Use?:  No


 (SURJIT PATEL)





Immunizations Up To Date


Tetanus Booster (TDap):  Unknown


PED Vaccines UTD:  Yes


First/Initial COVID19 Vaccinat:  NA


Second COVID19 Vaccination Tai:  NA


Third COVID19 Vaccination Date:  NA


 (SURJIT PATEL)





Seasonal Allergies


Seasonal Allergies:  Yes


 (SURJIT PATEL)





Past Medical History


Surgery/Hospitalization HX:  


DEMENTIA


Surgeries:  Yes (SKIN CANCERS REMOVED, KNEE )


Appendectomy, Gallbladder, Orthopedic


Respiratory:  Yes


Asthma, COPD


Currently Using CPAP:  No


Currently Using BIPAP:  No


Cardiac:  Yes (Grade 1 diastolic dysfunction)


Valvular Heart Disease


Neurological:  No


Reproductive Disorders:  No


Sexually Transmitted Disease:  No


Genitourinary:  Yes


Prostate Problems, Kidney Stones


Gastrointestinal:  Yes


Gastroesophageal Reflux, Chronic Constipation


Musculoskeletal:  Yes (r knee pain)


Arthritis, Fractures


Endocrine:  No


HEENT:  Yes (GLASSES, DENTURES)


Cataract


Cancer:  Yes


Skin


Psychosocial:  Yes (DEMENTIA PER WIFE - NOT DX)


Anxiety, Depression


Integumentary:  No


Blood Disorders:  No


Adverse Reaction/Blood Tranf:  No (N/A)


 (SURJIT PATEL)





Family Medical History





Patient reports no known family medical history.





Physical Exam


Vital Signs





Vital Signs - First Documented








 9/12/23





 22:04


 


Pulse 75


 


B/P (MAP) 130/90 (103)


 


Pulse Ox 98


 


O2 Delivery Room Air





 (JOELLE WOODARD MD)


Vital Signs


Capillary Refill :  


 (SURJIT PATEL)


Height, Weight, BMI


Height: 6'6.00"


Weight: 232lbs. 2.0oz. 105.312331pg; 26.00 BMI


Method:


General Appearance:  WD/WN, no apparent distress


HEENT:  PERRL/EOMI, normal ENT inspection, TMs normal, pharynx normal


Neck:  other (C-collar in place)


Cardiovascular:  regular rate, rhythm, no edema, no gallop


Respiratory:  chest non-tender, lungs clear, normal breath sounds, no 

respiratory distress, no accessory muscle use


Gastrointestinal:  normal bowel sounds, non tender, soft, no organomegaly


Back:  normal inspection, no CVA tenderness, no vertebral tenderness


Extremities:  normal range of motion, non-tender, normal inspection, no pedal 

edema


Neurologic/Psychiatric:  CNs II-XII nml as tested, no motor/sensory deficits, 

alert, normal mood/affect, oriented x 3


Skin:  other (Contusion to the forehead.  Superficial laceration.  Mild 

tenderness.  Very small pinpoint contusion to right lower lateral abdomen 

without tenderness) (SURJIT PATEL)





Progress/Results/Core Measures


Results/Orders


My Orders





Orders - JOELLE WOODARD MD


Acetaminophen  Tablet (Acetaminophen  Ta (9/12/23 23:45)


 (JOELLE WOODARD MD)


Medications Given in ED





Current Medications








 Medications  Dose


 Ordered  Sig/Campbell


 Route  Start Time


 Stop Time Status Last Admin


Dose Admin


 


 Acetaminophen  1,000 mg  ONCE  ONCE


 PO  9/12/23 23:45


 9/12/23 23:46 DC 9/12/23 23:42


1,000 MG





 (JOELLE WOODARD MD)


Vital Signs/I&O











 9/12/23 9/13/23





 22:04 01:17


 


Pulse 75 


 


B/P (MAP) 130/90 (103) 135/74


 


Pulse Ox 98 97


 


O2 Delivery Room Air Room Air





 (JOELLE WOODARD MD)








Blood Pressure Mean:                    103











Progress


Progress Note :  


   Time:  01:07


Progress Note


Patient care was assumed at shift change 11pm with CT reads pending. Patient 

having some discomfort with the c collar. Encouraged to leave it on until CT 

reads returned.  At approx 0040 CT's back which demonstrate no acute 

intracranial process, no cervical spine fractures or other acute pathology. His 

CT face shows nasal bone fractures. Patient reassessed by me. His lacerations to

mid forehead are very superficial and do not require suturing. He is neuro 

intact.  No complaints of numbness or weakness to his extremities. He is alert. 

Has some developing bruising to the right medial canthus. His wife had been 

waiting in the [facility] and was brought back to the room. She states he does have some

dementia. I reviewed the findings with her - and we discussed expectations over 

the next several days. I described he may have mild to moderated headaches, some

nausea and confusion, sleepiness or irritability. I advised her to return if 

symptoms became severe.  Acute wound care to forehead lacerations.  She 

confirmed she believed his tetanus was up to date.  I advised follow up with PCP

within the week. Return precautions provided in both verbal and written format.


 (JOELLE WOODARD MD)





Diagnostic Imaging





   Diagonstic Imaging:  CT


Comments


CT head, face and cervical spine : facial bones significant for nasal bone 

fractures; Brain and c-spine neg for any acute pathology/fracture


 (JOELLE WOODARD MD)





Departure


Communication (PCP)


Patient is a 82-year-old male who presents ED with a mechanical fall.  Fell 

while going down the porch hitting his forehead on bricks.  No loss of conscious

or on blood thinners.  EMS was contacted.  Patient was placed in c-collar as he 

was complaining of neck pain.  On arrival alert and orient x4.  GCS of 15.  C-

collar in place.  He has no focal neural deficits.  No vomiting.  Reports a mild

head pain.  Denies of any chest pain shortness of breath or abdominal pain.  He 

does have a small little superficial bruise to the right lateral abdomen.  No 

deep tenderness suggesting intra-abdominal injury.  No tenderness to the chest 

wall or ribs.  He had no thoracic or lumbar midline tenderness.  Moving all 

extremities.  Chest x-ray was ordered as well as CT scan of the head face and 

cervical neck.  Patient refused anything for pain.  Up-to-date on his tetanus.  

Patient was discussed with Dr. Woodard who took over care at 11 PM pending 

results.  Patient does have a superficial laceration to the forehead that does 

not require sutures.  Skin is approximated.  Ice was applied to the contusion 

which was noted.


 (SURJIT PATEL)





Impression





   Primary Impression:  


   Fall


   Qualified Codes:  W19.XXXA - Unspecified fall, initial encounter


   Additional Impressions:  


   Forehead contusion


   Qualified Codes:  S00.83XA - Contusion of other part of head, initial 

   encounter


   Forehead laceration


   Qualified Codes:  S01.81XA - Laceration without foreign body of other part of

   head, initial encounter


   Concussion


   Qualified Codes:  S06.0X0A - Concussion without loss of consciousness, 

   initial encounter


Disposition:  01 HOME, SELF-CARE


Condition:  Stable





Departure-Patient Inst.


Decision time for Depature:  01:03


 (JOELLE WOODARD MD)


Referrals:  


YONY ALCALA MD (PCP/Family)


Primary Care Physician


Patient Instructions:  Head Injury Observation (DC), Wound Care ED





Add. Discharge Instructions:  


Monitor him for worsening headache over the next couple of days.  If he develops

a severe headache with nausea and vomiting please return to the emergency 

department for reevaluation.





He can have 2 extra strength Tylenol every 6 hours as needed for mild to 

moderate headache.





Ice pack to the bridge of the nose and forehead for swelling.





Wash the wounds on his forehead with a mild soap and water.  You can place a 

little triple antibiotic ointment twice a day for 2 or 3 days with a dry bandage

and then leave it open to air.





Please call Dr. Alcala's office for a follow-up appointment in a week.





Return to the emergency department for any new, concerning or emergent 

complaints.





Copy


Copies To 1:   YONY ALCALA MD, ZACHARY A PA          Sep 12, 2023 22:24


JOELLE WOODARD MD         Sep 13, 2023 01:06

## 2023-09-13 VITALS — SYSTOLIC BLOOD PRESSURE: 135 MMHG | DIASTOLIC BLOOD PRESSURE: 74 MMHG

## 2023-09-13 NOTE — DIAGNOSTIC IMAGING REPORT
PROCEDURE: CT head, face, and cervical spine without contrast.



TECHNIQUE: Multiple contiguous axial images were obtained through

the head, neck, and facial bones without the use of intravenous

contrast. Sagittal and coronal reformations through the cervical

spine and facial bones were also performed. Auto Exposure

Controls were utilized during the CT exam to meet ALARA standards

for radiation dose reduction. 



INDICATION:  Fall with hematoma to forehead and nose, complaining

of head, face and neck pain.



Correlation is made with prior head CT from 04/25/2023.



CT HEAD:



There is a hematoma in the frontal scalp. Ventricles and sulci

are appropriate for the patient's age. No sulcal effacement or

midline shift is identified. No acute intra-axial or extra-axial

hemorrhage is detected. Cisterns are patent. Visualized paranasal

sinuses are clear.



IMPRESSION: Large frontal scalp hematoma. No acute intracranial

process is detected.



CT cervical spine:



There is minimal retrolisthesis of C3 on C4 and C4 on C5 with

minimal anterolisthesis of C5 on C6. 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

and C3 vertebral bodies and posterior elements, likely

congenital. There is multilevel degenerative disc and facet

disease. No fractures are identified. Prevertebral tissues are

within normal limits. Odontoid is intact.



IMPRESSION: Cervical spondylosis. No acute bony abnormality is

detected.



CT face:



The mandible is intact. The zygomatic arches are intact. The

maxillary sinus walls appear to be intact. There do appear to be

a nondisplaced right nasal bone fracture with some soft tissue

gas in the right nasal soft tissues. There is questionable

fracture of the nasal septum as well as probable fracture of the

anterior nasal spine. Orbital walls are intact. Visualized

paranasal sinuses are clear.



IMPRESSION: Nasal bone fractures, nondisplaced as well as

probable fracture of the nasal septum and anterior nasal spine.

No other facial bone fractures are identified.



Dictated by: 



  Dictated on workstation # [id number]

## 2023-09-13 NOTE — DIAGNOSTIC IMAGING REPORT
INDICATION:  Fall, hematoma, pain  



TECHNIQUE:  Single view chest 10:46 PM



CORRELATION STUDY:  10/31/2018



FINDINGS: 

The heart size, mediastinal configuration and pulmonary

vascularity are within normal limits.  

Lung fields slightly hyperinflated but overall appearing clear.

No acute displaced fracture.



IMPRESSION: 

1. Negative appearing single view chest.



Dictated by: 



  Dictated on workstation # [id number]
